# Patient Record
Sex: MALE | Race: BLACK OR AFRICAN AMERICAN | Employment: UNEMPLOYED | URBAN - METROPOLITAN AREA
[De-identification: names, ages, dates, MRNs, and addresses within clinical notes are randomized per-mention and may not be internally consistent; named-entity substitution may affect disease eponyms.]

---

## 2021-01-28 ENCOUNTER — APPOINTMENT (OUTPATIENT)
Dept: GENERAL RADIOLOGY | Age: 26
End: 2021-01-28
Payer: MEDICAID

## 2021-01-28 ENCOUNTER — HOSPITAL ENCOUNTER (EMERGENCY)
Age: 26
Discharge: HOME | End: 2021-01-28
Attending: STUDENT IN AN ORGANIZED HEALTH CARE EDUCATION/TRAINING PROGRAM
Payer: MEDICAID

## 2021-01-28 VITALS
OXYGEN SATURATION: 98 % | RESPIRATION RATE: 18 BRPM | HEART RATE: 78 BPM | TEMPERATURE: 97.2 F | DIASTOLIC BLOOD PRESSURE: 88 MMHG | BODY MASS INDEX: 24.88 KG/M2 | SYSTOLIC BLOOD PRESSURE: 136 MMHG | HEIGHT: 69 IN | WEIGHT: 168 LBS

## 2021-01-28 DIAGNOSIS — S62.339B OPEN BOXER'S FRACTURE, INITIAL ENCOUNTER: Primary | ICD-10-CM

## 2021-01-28 PROCEDURE — 99283 EMERGENCY DEPT VISIT LOW MDM: CPT

## 2021-01-28 PROCEDURE — 90715 TDAP VACCINE 7 YRS/> IM: CPT | Performed by: STUDENT IN AN ORGANIZED HEALTH CARE EDUCATION/TRAINING PROGRAM

## 2021-01-28 PROCEDURE — 73110 X-RAY EXAM OF WRIST: CPT

## 2021-01-28 PROCEDURE — 12001 RPR S/N/AX/GEN/TRNK 2.5CM/<: CPT

## 2021-01-28 PROCEDURE — 6360000002 HC RX W HCPCS: Performed by: STUDENT IN AN ORGANIZED HEALTH CARE EDUCATION/TRAINING PROGRAM

## 2021-01-28 PROCEDURE — 90471 IMMUNIZATION ADMIN: CPT | Performed by: STUDENT IN AN ORGANIZED HEALTH CARE EDUCATION/TRAINING PROGRAM

## 2021-01-28 PROCEDURE — 73130 X-RAY EXAM OF HAND: CPT

## 2021-01-28 PROCEDURE — 73630 X-RAY EXAM OF FOOT: CPT

## 2021-01-28 PROCEDURE — 73562 X-RAY EXAM OF KNEE 3: CPT

## 2021-01-28 RX ORDER — LIDOCAINE HYDROCHLORIDE 10 MG/ML
20 INJECTION, SOLUTION INFILTRATION; PERINEURAL ONCE
Status: DISCONTINUED | OUTPATIENT
Start: 2021-01-28 | End: 2021-01-28 | Stop reason: HOSPADM

## 2021-01-28 RX ORDER — ACETAMINOPHEN 325 MG/1
650 TABLET ORAL ONCE
Status: DISCONTINUED | OUTPATIENT
Start: 2021-01-28 | End: 2021-01-28 | Stop reason: HOSPADM

## 2021-01-28 RX ORDER — CEPHALEXIN 500 MG/1
500 CAPSULE ORAL 2 TIMES DAILY
Qty: 14 CAPSULE | Refills: 0 | Status: SHIPPED | OUTPATIENT
Start: 2021-01-28 | End: 2021-02-04

## 2021-01-28 RX ORDER — METHOCARBAMOL 500 MG/1
750 TABLET, FILM COATED ORAL 3 TIMES DAILY
Qty: 32 TABLET | Refills: 0 | Status: SHIPPED | OUTPATIENT
Start: 2021-01-28 | End: 2021-02-04

## 2021-01-28 RX ADMIN — TETANUS TOXOID, REDUCED DIPHTHERIA TOXOID AND ACELLULAR PERTUSSIS VACCINE, ADSORBED 0.5 ML: 5; 2.5; 8; 8; 2.5 SUSPENSION INTRAMUSCULAR at 12:17

## 2021-01-28 ASSESSMENT — ENCOUNTER SYMPTOMS
EYE PAIN: 0
SORE THROAT: 0
EYE REDNESS: 0
ABDOMINAL PAIN: 0
NAUSEA: 0
COLOR CHANGE: 0
SHORTNESS OF BREATH: 0
BACK PAIN: 0
VOMITING: 0
FACIAL SWELLING: 0
COUGH: 0
RHINORRHEA: 0
DIARRHEA: 0
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
## 2021-01-28 NOTE — ED PROVIDER NOTES
General: No focal deficit present. Mental Status: He is alert. Mental status is at baseline. Cranial Nerves: No cranial nerve deficit. MEDICAL DECISION MAKIN-year-old male presented with a injury to the right hand sustained immediately prior to arrival.  X-ray showed a boxer's fracture. This is an open injury with overlying laceration. Tetanus updated. Washed out at the bedside. Reviewed case with ortho on call Dr. Yandel Jarquin. Ok for PO antibiotics, splinting, outpatient follow up. CRITICAL CARE:       PROCEDURES:    Lac Repair    Date/Time: 2021 1:13 PM  Performed by: Idania Frey MD  Authorized by: Idania Frey MD     Consent:     Consent obtained:  Verbal    Consent given by:  Patient    Risks discussed:  Infection    Alternatives discussed:  No treatment  Anesthesia (see MAR for exact dosages): Anesthesia method:  Local infiltration    Local anesthetic:  Lidocaine 1% w/o epi  Laceration details:     Location:  Hand    Hand location:  R hand, dorsum    Length (cm):  1  Repair type:     Repair type:  Simple  Pre-procedure details:     Preparation:  Patient was prepped and draped in usual sterile fashion  Exploration:     Wound exploration: wound explored through full range of motion      Wound extent: underlying fracture      Contaminated: no    Treatment:     Area cleansed with:  Saline    Amount of cleaning:  Standard    Irrigation solution:  Sterile saline    Irrigation volume:  500    Irrigation method:  Pressure wash  Skin repair:     Repair method:  Sutures    Suture size:  5-0    Suture material:  Fast-absorbing gut    Suture technique:  Simple interrupted    Number of sutures:  1  Approximation:     Approximation:  Close  Post-procedure details:     Dressing:  Non-adherent dressing    Patient tolerance of procedure:   Tolerated well, no immediate complications    Splint Application    Date/Time: 2021 1:27 PM  Performed by: Idania Frey MD Authorized by: Iwona Dewey MD     Consent:     Consent obtained:  Verbal    Consent given by:  Patient    Risks discussed:  Discoloration and numbness  Pre-procedure details:     Sensation:  Weakness  Procedure details:     Laterality:  Right    Location:  Hand    Hand:  R hand    Splint type:  Ulnar gutter    Supplies:  Ortho-Glass  Post-procedure details:     Pain:  Unchanged    Sensation:  Unchanged    Skin color:  Normal     Patient tolerance of procedure: Tolerated well, no immediate complications        DIAGNOSTIC RESULTS   EKG:All EKG's are interpreted by the Emergency Department Physician who either signs or Co-signs this chart in the absence of a cardiologist.        RADIOLOGY:All plain film, CT, MRI, and formal ultrasound images (except ED bedside ultrasound) are read by the radiologist, see reports below, unless otherwisenoted in MDM or here. XR FOOT RIGHT (MIN 3 VIEWS)   Preliminary Result   5th metacarpal fracture. No acute osseous abnormality of the right knee or foot. XR KNEE RIGHT (3 VIEWS)   Preliminary Result   5th metacarpal fracture. No acute osseous abnormality of the right knee or foot. XR WRIST RIGHT (MIN 3 VIEWS)   Preliminary Result   5th metacarpal fracture. No acute osseous abnormality of the right knee or foot. XR HAND RIGHT (MIN 3 VIEWS)   Preliminary Result   5th metacarpal fracture. No acute osseous abnormality of the right knee or foot. LABS: All lab results were reviewed by myself, and all abnormals are listed below.   Labs Reviewed - No data to display    EMERGENCY DEPARTMENTCOURSE:         Vitals:    Vitals:    01/28/21 1017   BP: 136/88   Pulse: 78   Resp: 18   Temp: 97.2 °F (36.2 °C)   TempSrc: Oral   SpO2: 98%   Weight: 168 lb (76.2 kg)   Height: 5' 9\" (1.753 m)       The patient was given the following medications while in the emergency department:  Orders Placed This Encounter   Medications  Tetanus-Diphth-Acell Pertussis (BOOSTRIX) injection 0.5 mL    lidocaine 1 % injection 20 mL    acetaminophen (TYLENOL) tablet 650 mg    cephALEXin (KEFLEX) 500 MG capsule     Sig: Take 1 capsule by mouth 2 times daily for 7 days     Dispense:  14 capsule     Refill:  0     CONSULTS:  IP CONSULT TO ORTHOPEDIC SURGERY    FINAL IMPRESSION      1.  Open boxer's fracture, initial encounter          DISPOSITION/PLAN   DISPOSITION Discharge - Pending Orders Complete 01/28/2021 12:56:56 PM      PATIENT REFERRED TO:  Tera Lozano MD  [address] [place] [id number]  [phone]    Schedule an appointment as soon as possible for a visit in 1 week      DISCHARGE MEDICATIONS:  New Prescriptions    CEPHALEXIN (KEFLEX) 500 MG CAPSULE    Take 1 capsule by mouth 2 times daily for 7 days     Michelle Contreras MD  Attending Emergency Physician                    Michelle Contreras MD  01/28/21 3784

## 2021-02-03 ENCOUNTER — OFFICE VISIT (OUTPATIENT)
Dept: ORTHOPEDIC SURGERY | Age: 26
End: 2021-02-03

## 2021-02-03 DIAGNOSIS — S62.91XB OPEN FRACTURE OF RIGHT HAND, INITIAL ENCOUNTER: Primary | ICD-10-CM

## 2021-02-03 PROCEDURE — 99024 POSTOP FOLLOW-UP VISIT: CPT | Performed by: ORTHOPAEDIC SURGERY
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
## 2021-02-25 ENCOUNTER — OFFICE VISIT (OUTPATIENT)
Dept: ORTHOPEDIC SURGERY | Age: 26
End: 2021-02-25

## 2021-02-25 VITALS — TEMPERATURE: 97.8 F

## 2021-02-25 DIAGNOSIS — S62.326A DISPLACED FRACTURE OF SHAFT OF FIFTH METACARPAL BONE, RIGHT HAND, INITIAL ENCOUNTER FOR CLOSED FRACTURE: Primary | ICD-10-CM

## 2021-02-25 PROCEDURE — 99024 POSTOP FOLLOW-UP VISIT: CPT | Performed by: ORTHOPAEDIC SURGERY

## 2021-02-25 NOTE — PROGRESS NOTES
Status:   Future     Number of Occurrences:   1     Standing Expiration Date:   2/25/2022        Milagros Mckee MD    Please note that this chart was generated using voicerecognition Dragon dictation software. Although every effort was made to ensurethe accuracy of this automated transcription, some errors in transcription may haveoccurred.

## 2021-03-09 ENCOUNTER — OFFICE VISIT (OUTPATIENT)
Dept: ORTHOPEDIC SURGERY | Age: 26
End: 2021-03-09

## 2021-03-09 DIAGNOSIS — S62.326A DISPLACED FRACTURE OF SHAFT OF FIFTH METACARPAL BONE, RIGHT HAND, INITIAL ENCOUNTER FOR CLOSED FRACTURE: Primary | ICD-10-CM

## 2021-03-09 PROCEDURE — 99024 POSTOP FOLLOW-UP VISIT: CPT | Performed by: ORTHOPAEDIC SURGERY

## 2021-03-09 NOTE — PROGRESS NOTES
Patient ID: Kita Severe is a 31 y.o. male    Chief Compliant:  No chief complaint on file. HPI:  This is a 31 y.o. male who presents to the clinic today for right hand fracture follow up. Patient comes in with splint. Date of injury 1/28/21. He states soreness is improving. Difficulty moving 5th digit. Patient initially seen 4 weeks post injury. We therefore elected to treat this nonoperatively    Review of Systems   All other systems reviewed and are negative. Past History:    Current Outpatient Medications:     polyethylene glycol (MIRALAX) PACK packet, Take 17 g by mouth daily. , Disp: , Rfl:     fluticasone (FLOVENT HFA) 110 MCG/ACT inhaler, Inhale 1 puff into the lungs 2 times daily. , Disp: , Rfl:   Allergies   Allergen Reactions    Shellfish-Derived Products Shortness Of Breath    Aspirin     Ibuprofen     Iodinated Diagnostic Agents [Iodinated Diagnostic Agents]      Social History     Socioeconomic History    Marital status: Single     Spouse name: Not on file    Number of children: Not on file    Years of education: Not on file    Highest education level: Not on file   Occupational History    Not on file   Social Needs    Financial resource strain: Not on file    Food insecurity     Worry: Not on file     Inability: Not on file    Transportation needs     Medical: Not on file     Non-medical: Not on file   Tobacco Use    Smoking status: Never Smoker    Smokeless tobacco: Never Used   Substance and Sexual Activity    Alcohol use: Yes     Comment: daily, consumes 1 liters of liquor per day    Drug use: Yes     Types: Marijuana    Sexual activity: Not on file   Lifestyle    Physical activity     Days per week: Not on file     Minutes per session: Not on file    Stress: Not on file   Relationships    Social connections     Talks on phone: Not on file     Gets together: Not on file     Attends Buddhism service: Not on file     Active member of club or organization: Not on file     Attends meetings of clubs or organizations: Not on file     Relationship status: Not on file    Intimate partner violence     Fear of current or ex partner: Not on file     Emotionally abused: Not on file     Physically abused: Not on file     Forced sexual activity: Not on file   Other Topics Concern    Not on file   Social History Narrative    Not on file     Past Medical History:   Diagnosis Date    Asthma     Hypertension      Past Surgical History:   Procedure Laterality Date    FINGER SURGERY       No family history on file. Physical Exam:  Vitals signs and nursing note reviewed. Constitutional:       Appearance: She is well-developed. HENT:      Head: Normocephalic and atraumatic. Nose: Nose normal.   Eyes:      Conjunctiva/sclera: Conjunctivae normal.   Neck:      Musculoskeletal: Normal range of motion and neck supple. Pulmonary:      Effort: Pulmonary effort is normal. No respiratory distress. Musculoskeletal:      Comments: Normal gait     Skin:     General: Skin is warm and dry. Neurological:      Mental Status: She is alert and oriented to person, place, and time. Sensory: No sensory deficit. Psychiatric:         Behavior: Behavior normal.         Thought Content: Thought content normal.    Diagnostic xrays:     AP lateral and oblique right hand status post small finger metacarpal shaft fracture abundant callus formation    Assessment and Plan:  1. Displaced fracture of shaft of fifth metacarpal bone, right hand, initial encounter for closed fracture      Work on ROM of the fingers. Follow up in 1 week. Provider Attestation:  Neal Leiva, personally performed the services described in this documentation. All medical record entries made by the scribe were at my direction and in my presence. I have reviewed the chart and discharge instructions and agree that the records reflect my personal performance and is accurate and complete. Laura Leiva, MD 3/9/21     Scribe Attestation:  By signing my name below, Carmelo Dione, attest that this documentation has been prepared under the direction and in the presence of Dr. Cash De Leon. Electronically signed: Lacy Kilgore, 3/9/21     Please note that this chart was generated using voice recognition Dragon dictation software. Although every effort was made to ensure the accuracy of this automated transcription, some errors in transcription may have occurred.

## 2021-03-16 ENCOUNTER — OFFICE VISIT (OUTPATIENT)
Dept: ORTHOPEDIC SURGERY | Age: 26
End: 2021-03-16
Payer: MEDICAID

## 2021-03-16 VITALS — HEIGHT: 69 IN | BODY MASS INDEX: 25.03 KG/M2 | WEIGHT: 169 LBS

## 2021-03-16 DIAGNOSIS — S62.336D CLOSED DISPLACED FRACTURE OF NECK OF FIFTH METACARPAL BONE OF RIGHT HAND WITH ROUTINE HEALING, SUBSEQUENT ENCOUNTER: Primary | ICD-10-CM

## 2021-03-16 PROCEDURE — 1036F TOBACCO NON-USER: CPT | Performed by: ORTHOPAEDIC SURGERY

## 2021-03-16 PROCEDURE — G8427 DOCREV CUR MEDS BY ELIG CLIN: HCPCS | Performed by: ORTHOPAEDIC SURGERY

## 2021-03-16 PROCEDURE — 99213 OFFICE O/P EST LOW 20 MIN: CPT | Performed by: ORTHOPAEDIC SURGERY

## 2021-03-16 PROCEDURE — G8484 FLU IMMUNIZE NO ADMIN: HCPCS | Performed by: ORTHOPAEDIC SURGERY

## 2021-03-16 PROCEDURE — G8420 CALC BMI NORM PARAMETERS: HCPCS | Performed by: ORTHOPAEDIC SURGERY

## 2021-03-16 NOTE — PROGRESS NOTES
Patient ID: Michael Prater is a 31 y.o. male    Chief Compliant:  No chief complaint on file. HPI:  This is a 31 y.o. male who presents to the clinic today for 1 week follow up of right hand fracture. Date of injury 1/28/21. Patient initially seen 4 weeks post injury. We therefore elected to treat this nonoperatively    Patient notes continued stiffness and soreness of small finger. Review of Systems   All other systems reviewed and are negative. Past History:    Current Outpatient Medications:     polyethylene glycol (MIRALAX) PACK packet, Take 17 g by mouth daily. , Disp: , Rfl:     fluticasone (FLOVENT HFA) 110 MCG/ACT inhaler, Inhale 1 puff into the lungs 2 times daily. , Disp: , Rfl:   Allergies   Allergen Reactions    Shellfish-Derived Products Shortness Of Breath    Aspirin     Ibuprofen     Iodinated Diagnostic Agents [Iodinated Diagnostic Agents]      Social History     Socioeconomic History    Marital status: Single     Spouse name: Not on file    Number of children: Not on file    Years of education: Not on file    Highest education level: Not on file   Occupational History    Not on file   Social Needs    Financial resource strain: Not on file    Food insecurity     Worry: Not on file     Inability: Not on file    Transportation needs     Medical: Not on file     Non-medical: Not on file   Tobacco Use    Smoking status: Never Smoker    Smokeless tobacco: Never Used   Substance and Sexual Activity    Alcohol use: Yes     Comment: daily, consumes 1 liters of liquor per day    Drug use: Yes     Types: Marijuana    Sexual activity: Not on file   Lifestyle    Physical activity     Days per week: Not on file     Minutes per session: Not on file    Stress: Not on file   Relationships    Social connections     Talks on phone: Not on file     Gets together: Not on file     Attends Confucianism service: Not on file     Active member of club or organization: Not on file Attends meetings of clubs or organizations: Not on file     Relationship status: Not on file    Intimate partner violence     Fear of current or ex partner: Not on file     Emotionally abused: Not on file     Physically abused: Not on file     Forced sexual activity: Not on file   Other Topics Concern    Not on file   Social History Narrative    Not on file     Past Medical History:   Diagnosis Date    Asthma     Hypertension      Past Surgical History:   Procedure Laterality Date    FINGER SURGERY       No family history on file. Physical Exam:  Vitals signs and nursing note reviewed. Constitutional:       Appearance: She is well-developed. HENT:      Head: Normocephalic and atraumatic. Nose: Nose normal.   Eyes:      Conjunctiva/sclera: Conjunctivae normal.   Neck:      Musculoskeletal: Normal range of motion and neck supple. Pulmonary:      Effort: Pulmonary effort is normal. No respiratory distress. Musculoskeletal:      Comments: Normal gait     Skin:     General: Skin is warm and dry. Neurological:      Mental Status: She is alert and oriented to person, place, and time. Sensory: No sensory deficit. Psychiatric:         Behavior: Behavior normal.         Thought Content: Thought content normal.    Diagnostic xrays:     AP and lateral right hand small finger metacarpal shaft fracture slightly apex dorsally angulated slightly shortened significant callus formation but does not appear to be healed    Assessment and Plan:  1. Closed displaced fracture of neck of fifth metacarpal bone of right hand with routine healing, subsequent encounter          Follow up in 3 weeks. If patient is still stiff and painful, we will consider surgery. Provider Attestation:  Tamera Bumpers Beeks, personally performed the services described in this documentation. All medical record entries made by the scribe were at my direction and in my presence.  I have reviewed the chart and discharge instructions and agree that the records reflect my personal performance and is accurate and complete. Ryan Delgado MD 3/16/21     Scribe Attestation:  By signing my name below, Belem Hutton, attest that this documentation has been prepared under the direction and in the presence of Dr. Sonido Villar. Electronically signed: Lacy Iraheta, 3/16/21     Please note that this chart was generated using voice recognition Dragon dictation software. Although every effort was made to ensure the accuracy of this automated transcription, some errors in transcription may have occurred.

## 2021-03-25 ENCOUNTER — APPOINTMENT (OUTPATIENT)
Dept: GENERAL RADIOLOGY | Age: 26
End: 2021-03-25
Payer: MEDICAID

## 2021-03-25 ENCOUNTER — HOSPITAL ENCOUNTER (EMERGENCY)
Age: 26
Discharge: HOME | End: 2021-03-25
Attending: EMERGENCY MEDICINE
Payer: MEDICAID

## 2021-03-25 VITALS
WEIGHT: 169 LBS | DIASTOLIC BLOOD PRESSURE: 87 MMHG | RESPIRATION RATE: 18 BRPM | TEMPERATURE: 97.7 F | OXYGEN SATURATION: 98 % | HEART RATE: 78 BPM | HEIGHT: 69 IN | SYSTOLIC BLOOD PRESSURE: 137 MMHG | BODY MASS INDEX: 25.03 KG/M2

## 2021-03-25 DIAGNOSIS — M79.645 PAIN OF LEFT THUMB: Primary | ICD-10-CM

## 2021-03-25 DIAGNOSIS — S62.356D CLOSED NONDISPLACED FRACTURE OF SHAFT OF FIFTH METACARPAL BONE OF RIGHT HAND WITH ROUTINE HEALING, SUBSEQUENT ENCOUNTER: ICD-10-CM

## 2021-03-25 PROCEDURE — 99283 EMERGENCY DEPT VISIT LOW MDM: CPT

## 2021-03-25 PROCEDURE — 73130 X-RAY EXAM OF HAND: CPT

## 2021-03-25 PROCEDURE — 73110 X-RAY EXAM OF WRIST: CPT

## 2021-03-25 ASSESSMENT — PAIN DESCRIPTION - FREQUENCY: FREQUENCY: CONTINUOUS

## 2021-03-25 NOTE — ED NOTES
The patient is a 31year old male that came to the ER today due to his thumb hurting. SW met with the patient due to the patient telling the ER Resident that he is homicidal. SW met with the patient and asked about his  Homicidal thoughts. When SW asked patient about homicidal thoughts, patient states, \"Get out of my face. \" SW discussed with ER Resident.

## 2021-03-25 NOTE — ED PROVIDER NOTES
[facility]  Emergency Department  Faculty Attestation     I performed a history and physical examination of the patient and discussed management with the resident. I reviewed the residents note and agree with the documented findings and plan of care. Any areas of disagreement are noted on the chart. I was personally present for the key portions of any procedures. I have documented in the chart those procedures where I was not present during the key portions. I have reviewed the emergency nurses triage note. I agree with the chief complaint, past medical history, past surgical history, allergies, medications, social and family history as documented unless otherwise noted below. For Physician Assistant/ Nurse Practitioner cases/documentation I have personally evaluated this patient and have completed at least one if not all key elements of the E/M (history, physical exam, and MDM). Additional findings are as noted. Primary Care Physician:  No primary care provider on file. Screenings:  [unfilled]    CHIEF COMPLAINT       Chief Complaint   Patient presents with    Finger Pain     left thumb, right pinky pain       RECENT VITALS:   Temp: 97.7 °F (36.5 °C),  Pulse: 78, Resp: 18, BP: 137/87    LABS:  Labs Reviewed - No data to display    Radiology  XR HAND LEFT (MIN 3 VIEWS)   Final Result   Mild nonspecific left thumb soft tissue swelling. Otherwise unremarkable left hand. XR HAND RIGHT (MIN 3 VIEWS)   Final Result   Normal wrist radiographs      RIGHT HAND FINDINGS:   Incomplete union involving the mid 5th metacarpal fracture. Mild callus   formation. There is no evidence of  dislocation. The  joint spaces appear   well maintained. The soft tissues are unremarkable. IMPRESSION:   Incomplete union involving the mid 5th metacarpal fracture. Mild callus   formation.          XR WRIST LEFT (MIN 3 VIEWS)   Final Result   Normal wrist radiographs RIGHT HAND FINDINGS:   Incomplete union involving the mid 5th metacarpal fracture. Mild callus   formation. There is no evidence of  dislocation. The  joint spaces appear   well maintained. The soft tissues are unremarkable. IMPRESSION:   Incomplete union involving the mid 5th metacarpal fracture. Mild callus   formation. Attending Physician Additional  Notes    Patient fractured his right hand in late January. He was then ulnar gutter splint for 9 weeks. It is now removed. He has difficulty extending and even flexing his small finger. There is no repeat injury swelling redness fevers chills. He did have a minor trauma to his left wrist and thumb 4 days ago. There is dramatic swelling at the base of his thumb and painful movement of the wrist and thumb then which is partially improved. The swelling is dramatically improved. On exam he is nontoxic afebrile vital signs are normal.  He has visible bony deformity the midshaft of the right metacarpal.  No erythema warmth induration or fluctuance. He has limited strength in the small finger but can passively move his finger. He is able to initiate extension of the small finger at the MCP PCP and DIP, his flexion strength is stronger. No true contracture. The left wrist has tenderness in the snuffbox region. There is also pain with axial loading of the thumb. No other deformity. Impression is healing left small finger metacarpal fracture with weakness of the small finger, left wrist injury with concern for scaphoid fracture. Plan is imaging of both sides, Ace wrap the right hand, simple analgesics, thumb spica splint the left wrist/thumb, elevation of the left, continue passive range of movement of the small finger, follow-up to plastic surgery. Carleen Reagan.  Christiane Nunez MD, [facility]  Attending Emergency  Physician               Patricio Carolina MD  03/25/21 1944

## 2021-03-26 NOTE — ED PROVIDER NOTES
[facility] ED  Emergency Department Encounter  Emergency Medicine Resident     Pt Name: Harman Cooks  MRN: [id number]  [place] 1995  Date ofevaluation: 3/25/21  PCP:  No primary care provider on file. CHIEF COMPLAINT       Chief Complaint   Patient presents with    Finger Pain     left thumb, right pinky pain     HISTORY OF PRESENT ILLNESS  (Location/Symptom, Timing/Onset, Context/Setting, Quality, Duration, Modifying Factors, Severity, Associated signs/symptoms)     Harman Cooks is a 31 y.o. male who presents with bilateral hand pain. Patient reports that he punched a wall with his right hand several months ago and has a boxer's fracture. He was evaluated in outside emergency department for this, but states that he cannot move his pinky appropriately since the injury. He is not followed up since then. Additionally reports he is having atraumatic pain to the left thumb.  he states that it began several days ago and has been constant since onset. No obvious trauma or inciting factor. Also reports difficulty with moving his left thumb. No loss of sensation. PAST MEDICAL / SURGICAL / SOCIAL / FAMILY HISTORY      has a past medical history of Asthma and Hypertension. has a past surgical history that includes Finger surgery.     Social History     Socioeconomic History    Marital status: Single     Spouse name: Not on file    Number of children: Not on file    Years of education: Not on file    Highest education level: Not on file   Occupational History    Not on file   Social Needs    Financial resource strain: Not on file    Food insecurity     Worry: Not on file     Inability: Not on file    Transportation needs     Medical: Not on file     Non-medical: Not on file   Tobacco Use    Smoking status: Never Smoker    Smokeless tobacco: Never Used   Substance and Sexual Activity    Alcohol use: Yes     Comment: daily, consumes 1 liters of liquor per day    Drug use: toxic-appearing or diaphoretic. HENT:      Head: Normocephalic and atraumatic. Eyes:      General: No scleral icterus. Neck:      Musculoskeletal: Neck supple. Cardiovascular:      Rate and Rhythm: Normal rate and regular rhythm. Pulmonary:      Effort: Pulmonary effort is normal. No respiratory distress. Breath sounds: Normal breath sounds. No stridor. No wheezing, rhonchi or rales. Musculoskeletal:      Comments: Pain to palpation over the right distal metatarsal of the right hand. Patient states is unable to flex or extend his pinky however there is no ligamentous laxity noted in this area. No significant pain with passive range of motion. There is no dactylitis or tenderness palpation over the flexor tendon sheath. Patient also reports tenderness palpation over the base of the right thumb. No snuffbox tenderness. No significant pain with passive range of motion of the thumb. Skin:     General: Skin is warm and dry. Comments: No skin changes about the bilateral hands. Neurological:      General: No focal deficit present. Mental Status: He is alert and oriented to person, place, and time. Psychiatric:         Mood and Affect: Mood normal.         Behavior: Behavior normal.         DIAGNOSTICS     PLAN (LABS / IMAGING / EKG):  Orders Placed This Encounter   Procedures    XR HAND LEFT (MIN 3 VIEWS)    XR HAND RIGHT (MIN 3 VIEWS)    XR WRIST LEFT (MIN 3 VIEWS)    [employer] Thumb Spica, Left       MEDICATIONS ORDERED:  No orders of the defined types were placed in this encounter. DIAGNOSTIC RESULTS / EMERGENCYDEPARTMENT COURSE / MDM     LABS:  No results found for this visit on 03/25/21. RADIOLOGY:  XR HAND LEFT (MIN 3 VIEWS)   Final Result   Mild nonspecific left thumb soft tissue swelling. Otherwise unremarkable left hand.          XR HAND RIGHT (MIN 3 VIEWS)   Final Result   Normal wrist radiographs      RIGHT HAND FINDINGS:   Incomplete union involving the mid 5th metacarpal fracture. Mild callus   formation. There is no evidence of  dislocation. The  joint spaces appear   well maintained. The soft tissues are unremarkable. IMPRESSION:   Incomplete union involving the mid 5th metacarpal fracture. Mild callus   formation. XR WRIST LEFT (MIN 3 VIEWS)   Final Result   Normal wrist radiographs      RIGHT HAND FINDINGS:   Incomplete union involving the mid 5th metacarpal fracture. Mild callus   formation. There is no evidence of  dislocation. The  joint spaces appear   well maintained. The soft tissues are unremarkable. IMPRESSION:   Incomplete union involving the mid 5th metacarpal fracture. Mild callus   formation. EMERGENCY DEPARTMENT COURSE:         MDM: Patient presenting with bilateral hand pain. On exam is nontoxic in no acute distress. Unremarkable. He has pain to palpation over the left thumb with axial loading of the thumb. There is no snuffbox tenderness to suggest a scaphoid fracture. No significant pain with passive range of motion. There is no signs of flexor tenosynovitis on either hand. Does also have some pain and limited range of motion of his right pinky. Both fingers are neurovascularly intact otherwise. Plan is for x-ray of bilateral hands, reassess. X-rays show known distal metacarpal fracture of the right hand. There is no obvious osseous abnormalities of the left thumb. Provided a left-sided thumb spica splint. Will discharge with follow-up with hand surgery. Discussed supportive care measures. Strict return precautions given. Patient instructed to follow with his primary care provider as soon as possible for follow up. Patient and/or family expressed understanding and agreement with this plan. PROCEDURES:  none    CONSULTS:  None    FINAL IMPRESSION      1. Pain of left thumb    2.  Closed nondisplaced fracture of shaft of fifth metacarpal bone of right hand with routine healing, subsequent encounter          DISPOSITION / PLAN     DISPOSITION Decision To Discharge 03/25/2021 07:23:18 PM      PATIENT REFERRED TO:  Lanie Hartman MD  [address] [address] [phone]    Schedule an appointment as soon as possible for a visit   Follow up of this visit      DISCHARGE MEDICATIONS:  Discharge Medication List as of 3/25/2021  7:25 PM          Khurram Cheek DO  Emergency Medicine Resident  [address] [facility]    (Please note that portions of this note were completed with a voice recognition program.  Efforts were made to edit thedictations but occasionally words are mis-transcribed.)       Khurram Cheke DO  Resident  03/25/21 1864

## 2021-04-06 ENCOUNTER — OFFICE VISIT (OUTPATIENT)
Dept: ORTHOPEDIC SURGERY | Age: 26
End: 2021-04-06

## 2021-04-06 VITALS — BODY MASS INDEX: 25.03 KG/M2 | WEIGHT: 169 LBS | HEIGHT: 69 IN

## 2021-04-06 DIAGNOSIS — M65.4 DE QUERVAIN'S DISEASE (TENOSYNOVITIS): ICD-10-CM

## 2021-04-06 DIAGNOSIS — M79.641 RIGHT HAND PAIN: Primary | ICD-10-CM

## 2021-04-06 DIAGNOSIS — S62.326A DISPLACED FRACTURE OF SHAFT OF FIFTH METACARPAL BONE, RIGHT HAND, INITIAL ENCOUNTER FOR CLOSED FRACTURE: ICD-10-CM

## 2021-04-06 PROCEDURE — 99024 POSTOP FOLLOW-UP VISIT: CPT | Performed by: ORTHOPAEDIC SURGERY

## 2021-04-06 NOTE — PROGRESS NOTES
Patient ID: Justyna Avendaño is a 31 y.o. male    Chief Compliant:  No chief complaint on file. HPI:  This is a 31 y.o. male who presents to the clinic today for 3 week follow up of open boxer's fracture. Patient notes continued dull, grinding pain of the right small finger. Injury occurred about 2.5 months ago. Patient wore splint for about 9 weeks, no longer wearing splint. Additionally, patient notes stiffness of the left thumb. He denies triggering. Review of Systems   All other systems reviewed and are negative.       Past History:    Current Outpatient Medications:     Multiple Vitamins-Minerals (HAIR SKIN & NAILS ADVANCED PO), Take 1 capsule by mouth daily, Disp: , Rfl:   Allergies   Allergen Reactions    Aspirin Shortness Of Breath    Ibuprofen Shortness Of Breath    Iodinated Diagnostic Agents Shortness Of Breath    Shellfish-Derived Products Shortness Of Breath     Blurred vision, \"Seize up\"     Social History     Socioeconomic History    Marital status: Single     Spouse name: Not on file    Number of children: Not on file    Years of education: Not on file    Highest education level: Not on file   Occupational History    Not on file   Social Needs    Financial resource strain: Not on file    Food insecurity     Worry: Not on file     Inability: Not on file    Transportation needs     Medical: Not on file     Non-medical: Not on file   Tobacco Use    Smoking status: Never Smoker    Smokeless tobacco: Never Used   Substance and Sexual Activity    Alcohol use: Yes     Comment: daily, consumes 1 liters of liquor per day    Drug use: Yes     Types: Marijuana     Comment: occasionally uses cigar wrappers to roll joint    Sexual activity: Not on file   Lifestyle    Physical activity     Days per week: Not on file     Minutes per session: Not on file    Stress: Not on file   Relationships    Social connections     Talks on phone: Not on file     Gets together: Not on file Attends Moravian service: Not on file     Active member of club or organization: Not on file     Attends meetings of clubs or organizations: Not on file     Relationship status: Not on file    Intimate partner violence     Fear of current or ex partner: Not on file     Emotionally abused: Not on file     Physically abused: Not on file     Forced sexual activity: Not on file   Other Topics Concern    Not on file   Social History Narrative    Not on file     Past Medical History:   Diagnosis Date    Asthma     Hypertension      Past Surgical History:   Procedure Laterality Date    FINGER SURGERY       No family history on file. Physical Exam:  Vitals signs and nursing note reviewed. Constitutional:       Appearance: She is well-developed. HENT:      Head: Normocephalic and atraumatic. Nose: Nose normal.   Eyes:      Conjunctiva/sclera: Conjunctivae normal.   Neck:      Musculoskeletal: Normal range of motion and neck supple. Pulmonary:      Effort: Pulmonary effort is normal. No respiratory distress. Musculoskeletal:      Comments: Normal gait     Skin:     General: Skin is warm and dry. Neurological:      Mental Status: She is alert and oriented to person, place, and time. Sensory: No sensory deficit. Psychiatric:         Behavior: Behavior normal.         Thought Content: Thought content normal.  Right small finger still demonstrates a bit of loss of active flexion passive flexion is easily obtained a full fist.    Left hand patient is complaining of pain over the first dorsal compartment positive Finkelstein    Diagnostic xrays:     AP and lateral right hand abundant callus formation small finger metacarpal shaft fracture appears to be healed radially    Assessment and Plan:  1. Right hand pain    2. De Quervain's disease (tenosynovitis)    3.  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 All medical record entries made by the scribe were at my direction and in my presence. I have reviewed the chart and discharge instructions and agree that the records reflect my personal performance and is accurate and complete. Lebron Josue MD 4/6/21     Scribe Attestation:  By signing my name below, Josette Lowery, attest that this documentation has been prepared under the direction and in the presence of Dr. Annamaria Christianson. Electronically signed: Lacy Shabazz, 4/6/21     Please note that this chart was generated using voice recognition Dragon dictation software. Although every effort was made to ensure the accuracy of this automated transcription, some errors in transcription may have occurred.

## 2021-04-06 NOTE — LETTER
[address]  [address]  [address] [facility] [address]  Phone: [phone]  Fax: [phone]    Alka Estrada MD        April 6, 2021     Patient: Zenobia Null   YOB: 1995   Date of Visit: 4/6/2021       To Whom It May Concern: It is my medical opinion that Hellen Wheeler should remain out of work until Monday, 6/7/2021. If you have any questions or concerns, please don't hesitate to call.     Sincerely,        Alka Estrada MD

## 2021-05-11 ENCOUNTER — OFFICE VISIT (OUTPATIENT)
Dept: ORTHOPEDIC SURGERY | Age: 26
End: 2021-05-11
Payer: MEDICAID

## 2021-05-11 DIAGNOSIS — S62.336D CLOSED DISPLACED FRACTURE OF NECK OF FIFTH METACARPAL BONE OF RIGHT HAND WITH ROUTINE HEALING, SUBSEQUENT ENCOUNTER: Primary | ICD-10-CM

## 2021-05-11 PROCEDURE — G8428 CUR MEDS NOT DOCUMENT: HCPCS | Performed by: ORTHOPAEDIC SURGERY

## 2021-05-11 PROCEDURE — 99213 OFFICE O/P EST LOW 20 MIN: CPT | Performed by: ORTHOPAEDIC SURGERY

## 2021-05-11 PROCEDURE — 1036F TOBACCO NON-USER: CPT | Performed by: ORTHOPAEDIC SURGERY

## 2021-05-11 PROCEDURE — G8420 CALC BMI NORM PARAMETERS: HCPCS | Performed by: ORTHOPAEDIC SURGERY

## 2021-05-11 NOTE — PROGRESS NOTES
Patient ID: Camille Alejo is a 31 y.o. male    Chief Compliant:  No chief complaint on file. HPI:  This is a 31 y.o. male who presents to the clinic today for 1 month follow up for open boxer's fracture     Patient notes continued dull, grinding pain of the right small finger    It is severly interfering with his ADLs      Patient is cussing and disruptive in the waiting room he was brought into the clinic early despite the fact that he showed up for his clinic appointment late    Patient was on his phone entire visit    Patient was cussing to people on his phone    Trying to see the patient he did not get off his phone    Patient continued to cuss with pressured speech during visit    Patient seen after this patient reports that in the waiting room he was calling the mother Fu. .ers and threatening to shoot up the waiting room        Review of Systems   All other systems reviewed and are negative.       Past History:    Current Outpatient Medications:     Multiple Vitamins-Minerals (HAIR SKIN & NAILS ADVANCED PO), Take 1 capsule by mouth daily, Disp: , Rfl:   Allergies   Allergen Reactions    Aspirin Shortness Of Breath    Ibuprofen Shortness Of Breath    Iodinated Diagnostic Agents Shortness Of Breath    Shellfish-Derived Products Shortness Of Breath     Blurred vision, \"Seize up\"     Social History     Socioeconomic History    Marital status: Single     Spouse name: Not on file    Number of children: Not on file    Years of education: Not on file    Highest education level: Not on file   Occupational History    Not on file   Social Needs    Financial resource strain: Not on file    Food insecurity     Worry: Not on file     Inability: Not on file    Transportation needs     Medical: Not on file     Non-medical: Not on file   Tobacco Use    Smoking status: Never Smoker    Smokeless tobacco: Never Used   Substance and Sexual Activity    Alcohol use: Yes     Comment: daily, consumes 1 liters of liquor per day    Drug use: Yes     Types: Marijuana     Comment: occasionally uses cigar wrappers to roll joint    Sexual activity: Not on file   Lifestyle    Physical activity     Days per week: Not on file     Minutes per session: Not on file    Stress: Not on file   Relationships    Social connections     Talks on phone: Not on file     Gets together: Not on file     Attends Sikh service: Not on file     Active member of club or organization: Not on file     Attends meetings of clubs or organizations: Not on file     Relationship status: Not on file    Intimate partner violence     Fear of current or ex partner: Not on file     Emotionally abused: Not on file     Physically abused: Not on file     Forced sexual activity: Not on file   Other Topics Concern    Not on file   Social History Narrative    Not on file     Past Medical History:   Diagnosis Date    Asthma     Hypertension      Past Surgical History:   Procedure Laterality Date    FINGER SURGERY       No family history on file. Physical Exam:  Vitals signs and nursing note reviewed. Constitutional:       Appearance: She is well-developed. HENT:      Head: Normocephalic and atraumatic. Nose: Nose normal.   Eyes:      Conjunctiva/sclera: Conjunctivae normal.   Neck:      Musculoskeletal: Normal range of motion and neck supple. Pulmonary:      Effort: Pulmonary effort is normal. No respiratory distress. Musculoskeletal:      Comments: Normal gait     Skin:     General: Skin is warm and dry. Neurological:      Mental Status: She is alert and oriented to person, place, and time. Sensory: No sensory deficit. Psychiatric:         Behavior: Behavior normal.         Thought Content:  Thought content normal.    Physical exam patient with prominence fifth metacarpal shaft    Diagnostic imaging:    AP lateral right hand reveals significant callus formation about a small finger metacarpal shaft fracture    Assessment and

## 2022-06-15 ENCOUNTER — APPOINTMENT (OUTPATIENT)
Dept: GENERAL RADIOLOGY | Age: 27
End: 2022-06-15
Payer: MEDICAID

## 2022-06-15 ENCOUNTER — HOSPITAL ENCOUNTER (EMERGENCY)
Age: 27
Discharge: HOME | End: 2022-06-15
Attending: STUDENT IN AN ORGANIZED HEALTH CARE EDUCATION/TRAINING PROGRAM
Payer: MEDICAID

## 2022-06-15 VITALS
WEIGHT: 178 LBS | OXYGEN SATURATION: 100 % | HEART RATE: 86 BPM | SYSTOLIC BLOOD PRESSURE: 134 MMHG | DIASTOLIC BLOOD PRESSURE: 82 MMHG | BODY MASS INDEX: 26.36 KG/M2 | TEMPERATURE: 98.4 F | RESPIRATION RATE: 18 BRPM | HEIGHT: 69 IN

## 2022-06-15 DIAGNOSIS — L03.221 CELLULITIS OF NECK: ICD-10-CM

## 2022-06-15 DIAGNOSIS — R21 RASH AND OTHER NONSPECIFIC SKIN ERUPTION: Primary | ICD-10-CM

## 2022-06-15 DIAGNOSIS — M25.511 ACUTE PAIN OF RIGHT SHOULDER: ICD-10-CM

## 2022-06-15 PROCEDURE — 96372 THER/PROPH/DIAG INJ SC/IM: CPT

## 2022-06-15 PROCEDURE — 99284 EMERGENCY DEPT VISIT MOD MDM: CPT

## 2022-06-15 PROCEDURE — 6360000002 HC RX W HCPCS: Performed by: NURSE PRACTITIONER

## 2022-06-15 PROCEDURE — 73030 X-RAY EXAM OF SHOULDER: CPT

## 2022-06-15 RX ORDER — DEXAMETHASONE SODIUM PHOSPHATE 10 MG/ML
10 INJECTION, SOLUTION INTRAMUSCULAR; INTRAVENOUS ONCE
Status: COMPLETED | OUTPATIENT
Start: 2022-06-15 | End: 2022-06-15

## 2022-06-15 RX ORDER — CYCLOBENZAPRINE HCL 10 MG
10 TABLET ORAL 3 TIMES DAILY PRN
Qty: 15 TABLET | Refills: 0 | Status: SHIPPED | OUTPATIENT
Start: 2022-06-15 | End: 2022-06-20

## 2022-06-15 RX ORDER — CEPHALEXIN 500 MG/1
500 CAPSULE ORAL 3 TIMES DAILY
Qty: 21 CAPSULE | Refills: 0 | Status: SHIPPED | OUTPATIENT
Start: 2022-06-15 | End: 2022-06-22

## 2022-06-15 RX ORDER — HYDROXYZINE HYDROCHLORIDE 25 MG/1
25 TABLET, FILM COATED ORAL EVERY 6 HOURS PRN
Qty: 15 TABLET | Refills: 0 | Status: SHIPPED | OUTPATIENT
Start: 2022-06-15

## 2022-06-15 RX ORDER — PREDNISONE 20 MG/1
40 TABLET ORAL DAILY
Qty: 6 TABLET | Refills: 0 | Status: SHIPPED | OUTPATIENT
Start: 2022-06-15 | End: 2022-06-18

## 2022-06-15 RX ADMIN — DEXAMETHASONE SODIUM PHOSPHATE 10 MG: 10 INJECTION, SOLUTION INTRAMUSCULAR; INTRAVENOUS at 21:16

## 2022-06-15 ASSESSMENT — PAIN DESCRIPTION - LOCATION: LOCATION: NECK

## 2022-06-15 ASSESSMENT — PAIN SCALES - GENERAL: PAINLEVEL_OUTOF10: 7

## 2022-06-15 ASSESSMENT — PAIN - FUNCTIONAL ASSESSMENT: PAIN_FUNCTIONAL_ASSESSMENT: 0-10

## 2022-06-16 NOTE — ED PROVIDER NOTES
[address] [place] [address] ED  EMERGENCY DEPARTMENT ENCOUNTER      Pt Name: Anu Stewart  MRN: [id number]  [place] 1995  Date of evaluation: 6/15/2022  Provider: Leon Cruz       Chief Complaint   Patient presents with    Rash     2 day history of rash, unknown origin    Neck Pain     denies known injury          HISTORY OFPRESENT ILLNESS  (Location/Symptom, Timing/Onset, Context/Setting, Quality, Duration, Modifying Factors, Severity.)   Anu Stewart is a 28 y.o. male who presents to the emergency department by private auto for evaluation of rash that started on the back of his neck 2 days ago and has now spread to his shoulders and upper back area. States he squeezed some pus out of the rash in the back of his neck yesterday. Rash itches. No difficulty swallowing. No shortness of breath. Sure if something bit him or he came in contact with something. States he has had a previous right rotator cuff injury never had surgery. States he has had increased shoulder pain over the past several days. Pain worse with certain movement. Pain is mild. Nursing Notes were reviewed. PASTMEDICAL HISTORY     Past Medical History:   Diagnosis Date    Asthma     Hypertension          SURGICAL HISTORY       Past Surgical History:   Procedure Laterality Date    FINGER SURGERY           CURRENT MEDICATIONS     Previous Medications    MULTIPLE VITAMINS-MINERALS (HAIR SKIN & NAILS ADVANCED PO)    Take 1 capsule by mouth daily       ALLERGIES     Aspirin, Ibuprofen, Iodinated diagnostic agents, and Shellfish-derived products    FAMILY HISTORY     History reviewed. No pertinent family history.        SOCIAL HISTORY       Social History     Socioeconomic History    Marital status: Single     Spouse name: None    Number of children: None    Years of education: None    Highest education level: None   Occupational History    None   Tobacco Use    Smoking status: Never Smoker    Smokeless tobacco: Never Used   Substance and Sexual Activity    Alcohol use: Yes     Comment: daily, consumes 1 liters of liquor per day    Drug use: Yes     Types: Marijuana (Weed)     Comment: occasionally uses cigar wrappers to roll joint    Sexual activity: None   Other Topics Concern    None   Social History Narrative    None     Social Determinants of Health     Financial Resource Strain:     Difficulty of Paying Living Expenses: Not on file   Food Insecurity:     Worried About Running Out of Food in the Last Year: Not on file    Violeta of Food in the Last Year: Not on file   Transportation Needs:     Lack of Transportation (Medical): Not on file    Lack of Transportation (Non-Medical): Not on file   Physical Activity:     Days of Exercise per Week: Not on file    Minutes of Exercise per Session: Not on file   Stress:     Feeling of Stress : Not on file   Social Connections:     Frequency of Communication with Friends and Family: Not on file    Frequency of Social Gatherings with Friends and Family: Not on file    Attends Pentecostalism Services: Not on file    Active Member of [address] [place] [address] or Organizations: Not on file    Attends Club or Organization Meetings: Not on file    Marital Status: Not on file   Intimate Partner Violence:     Fear of Current or Ex-Partner: Not on file    Emotionally Abused: Not on file    Physically Abused: Not on file    Sexually Abused: Not on file   Housing Stability:     Unable to Pay for Housing in the Last Year: Not on file    Number of [place] in the Last Year: Not on file    Unstable Housing in the Last Year: Not on file         REVIEW OF SYSTEMS    (2-9 systems for level 4, 10 or more for level 5)     Review of Systems   Constitutional: Positive for activity change. Musculoskeletal: Positive for arthralgias. Negative for joint swelling. Skin: Positive for rash. All other systems reviewed and are negative.     Except as noted above the remainder of the review of systems was reviewed and negative. PHYSICAL EXAM    (up to 7 for level 4, 8 or more for level 5)     ED Triage Vitals [06/15/22 2002]   BP Temp Temp src Heart Rate Resp SpO2 Height Weight   134/82 98.4 °F (36.9 °C) -- 86 18 100 % 5' 9\" (1.753 m) 178 lb (80.7 kg)       Physical Exam  Constitutional:       Appearance: Normal appearance. He is well-developed, well-groomed and normal weight. HENT:      Head: Normocephalic. Right Ear: External ear normal.      Left Ear: External ear normal.   Eyes:      Conjunctiva/sclera: Conjunctivae normal.   Pulmonary:      Effort: Pulmonary effort is normal. No respiratory distress. Musculoskeletal:      Right shoulder: Tenderness present. No swelling or crepitus. Decreased range of motion. Normal strength. Normal pulse. Cervical back: Normal range of motion. Comments: No swelling or ecchymosis of the right shoulder. He has generalized tenderness. No deformity. Pain is worse when lifting his right arm above his head. Skin:     General: Skin is warm and dry. Capillary Refill: Capillary refill takes less than 2 seconds. Findings: Erythema and rash present. Comments: Patient has multiple raised areas of erythema to the posterior neck that spreads to both shoulders scapulas and around to the chest area. Rash does not spread to the abdomen or lower back. No rash on his arms. Neurological:      Mental Status: He is alert and oriented to person, place, and time. Psychiatric:         Mood and Affect: Mood normal.         Behavior: Behavior normal.         Thought Content:  Thought content normal.         Judgment: Judgment normal.           DIAGNOSTIC RESULTS     EKG:All EKG's are interpreted by the Emergency Department Physician who either signs or Co-signs this chart in the absence of a cardiologist.        RADIOLOGY:   Non-plain film images such as CT, Ultrasound and MRI are read by theradiologist. Plain radiographic images are visualized and preliminarily interpreted by the emergency physician with the below findings:    XR SHOULDER RIGHT (MIN 2 VIEWS)    Result Date: 6/15/2022  EXAMINATION: 2 XRAY VIEWS OF THE RIGHT SHOULDER 6/15/2022 8:44 pm COMPARISON: 02/18/2014 HISTORY: ORDERING SYSTEM PROVIDED HISTORY: Right shoulder pain several weeks, denies recent injury. Does have a history of right rotator cuff injury. TECHNOLOGIST PROVIDED HISTORY: Right shoulder pain several weeks, denies recent injury. Does have a history of right rotator cuff injury. Reason for Exam: shoulder and neck pain x 2 days. no injury FINDINGS: No fracture, dislocation, or focal osseous lesion is noted. No significant soft tissue abnormality seen. No fracture or dislocation. Interpretation per the Radiologist below, if available at the time of this note:    XR SHOULDER RIGHT (MIN 2 VIEWS)   Final Result   No fracture or dislocation. EDBEDSIDE ULTRASOUND:   Performed by Haider Hogan - none    LABS:  Labs Reviewed - No data to display    All other labs were within normal range or not returned as of this dictation. EMERGENCY DEPARTMENT COURSE andDIFFERENTIAL DIAGNOSIS/MDM:   X-ray right shoulder per radiologist no fracture or dislocation. Exam shows erythematous raised rash to the back of the neck extends to the shoulder blades around the chest.  Has some erythema where he had scratched around the rash on the back of his neck. Likely small area of cellulitis. No abscess. He was given Decadron in the ED. Prescriptions written for Atarax, prednisone, Flexeril, and Keflex. Given referral to orthopedic doctor for evaluation of his shoulder pain. Also provided with [facility] same-day PCP for follow-up. Return precautions provided.     Vitals:    Vitals:    06/15/22 2002   BP: 134/82   Pulse: 86   Resp: 18   Temp: 98.4 °F (36.9 °C)   SpO2: 100%   Weight: 178 lb (80.7 kg)   Height: 5' 9\" (1.753 m) CONSULTS:  None    RES:  Procedures    FINAL IMPRESSION      1. Rash and other nonspecific skin eruption    2. Cellulitis of neck    3.  Acute pain of right shoulder          DISPOSITION/PLAN   DISPOSITION Decision To Discharge 06/15/2022 09:37:25 PM      PATIENT REFERRED TO:     See PCP on list provided for follow-up  In 1 week      MD Alex Forrester 36  [address] [facility] [phone]      As needed    [facility] ED  [address] [facility]  [phone]    If symptoms worsen      DISCHARGE MEDICATIONS:     New Prescriptions    CEPHALEXIN (KEFLEX) 500 MG CAPSULE    Take 1 capsule by mouth 3 times daily for 7 days    CYCLOBENZAPRINE (FLEXERIL) 10 MG TABLET    Take 1 tablet by mouth 3 times daily as needed for Muscle spasms    HYDROXYZINE HCL (ATARAX) 25 MG TABLET    Take 1 tablet by mouth every 6 hours as needed for Itching    PREDNISONE (DELTASONE) 20 MG TABLET    Take 2 tablets by mouth daily for 3 days     Electronically signed by JENN Mendoza 6/15/2022 at 9:45 PM            JENN Mendoza CNP  06/15/22 2140

## 2022-06-17 ENCOUNTER — HOSPITAL ENCOUNTER (EMERGENCY)
Age: 27
Discharge: HOME | End: 2022-06-17
Attending: EMERGENCY MEDICINE
Payer: MEDICAID

## 2022-06-17 VITALS
RESPIRATION RATE: 16 BRPM | HEART RATE: 81 BPM | TEMPERATURE: 98.7 F | SYSTOLIC BLOOD PRESSURE: 136 MMHG | OXYGEN SATURATION: 100 % | DIASTOLIC BLOOD PRESSURE: 86 MMHG

## 2022-06-17 DIAGNOSIS — R21 RASH AND OTHER NONSPECIFIC SKIN ERUPTION: Primary | ICD-10-CM

## 2022-06-17 DIAGNOSIS — M25.511 ACUTE PAIN OF RIGHT SHOULDER: ICD-10-CM

## 2022-06-17 PROCEDURE — 99283 EMERGENCY DEPT VISIT LOW MDM: CPT

## 2022-06-17 PROCEDURE — 6370000000 HC RX 637 (ALT 250 FOR IP): Performed by: STUDENT IN AN ORGANIZED HEALTH CARE EDUCATION/TRAINING PROGRAM

## 2022-06-17 RX ORDER — ACYCLOVIR 200 MG/1
800 CAPSULE ORAL ONCE
Status: COMPLETED | OUTPATIENT
Start: 2022-06-17 | End: 2022-06-17

## 2022-06-17 RX ORDER — ACYCLOVIR 800 MG/1
800 TABLET ORAL
Qty: 35 TABLET | Refills: 0 | Status: SHIPPED | OUTPATIENT
Start: 2022-06-17 | End: 2022-06-24

## 2022-06-17 RX ADMIN — ACYCLOVIR 800 MG: 200 CAPSULE ORAL at 18:35

## 2022-06-17 ASSESSMENT — ENCOUNTER SYMPTOMS
SHORTNESS OF BREATH: 0
BACK PAIN: 0
VOMITING: 0
ABDOMINAL PAIN: 0
CONSTIPATION: 0
NAUSEA: 0
DIARRHEA: 0

## 2022-06-17 NOTE — ED PROVIDER NOTES
I performed a history and physical examination of the patient and discussed management with the resident. I reviewed the residents note and agree with the documented findings and plan of care. Any areas of disagreement are noted on the chart. I was personally present for the key portions of any procedures. I have documented in the chart those procedures where I was not present during the key portions. I have reviewed the emergency nurses triage note. I agree with the chief complaint, past medical history, past surgical history, allergies, medications, social and family history as documented unless otherwise noted below. Documentation of the HPI, Physical Exam and Medical Decision Making performed by medical students or scribes is based on my personal performance of the HPI, PE and MDM. For Phys Assistant/ Nurse Practitioner cases/documentation I have personally evaluated this patient and have completed at least one if not all key elements of the E/M (history, physical exam, and MDM). I find the patient's history and physical exam are consistent with the NP/PA documentation. I agree with the care provided, treatment rendered, disposition and followup plan. Additional findings are as noted. Elodia Clemons MD  Attending Emergency  Physician     this patient presents with 2 specific complaints: 1. He is complaining of pain in the right shoulder which apparently is chronic secondary to an injury sustained 12 years ago. He has been diagnosed with a rotator cuff tear and has been unable to follow-up with the appropriate orthopedist for evaluation and treatment. 2.  Patient presents with complaints of a pruritic rash on his neck shoulder and chest which began 4 to 5 days ago. Should be noted the patient was evaluated at [facility] emergency department 2 days ago with the same complaints and was diagnosed with a rotator cuff injury and nonspecific rash.   He is not been taking the medications as prescribed because they have apparently had no significant effect on his symptoms. Patient denies any fevers or chills. No numbness or tingling. He denies a prior history of varicella. On examination the patient is awake and alert. He is marginally cooperative. Examination of his skin demonstrates grouped vesicles which do not cross the midline starting in the right lower posterior neck and extending out over C5-C6 dermatomal region. Shoulder demonstrates no swelling/deformity. Limited ROM secondary to discomfort. Distal sensation, strength, pulses normal in right upper extremity. Impression:Right shoulder pain, probable rotator cuff injury. HVZ right C5-6 distribution. Plan: Discharge, Rx antivirals, Ortho f/u.             Renuka Palaciso MD  06/17/22 0606

## 2022-06-17 NOTE — Clinical Note
Jarad Ortiz was seen and treated in our emergency department on 6/17/2022. He may return to school on 06/24/2022. If you have any questions or concerns, please don't hesitate to call.       Nguyen Donovan MD

## 2022-06-17 NOTE — Clinical Note
Juan Carlos Camp was seen and treated in our emergency department on 6/17/2022. He may return to school on 06/24/2022. If you have any questions or concerns, please don't hesitate to call.       Kenneth Hernandez, DO

## 2022-06-17 NOTE — Clinical Note
Preeti Penaloza was seen and treated in our emergency department on 6/17/2022. He may return to work on 06/24/2022. If you have any questions or concerns, please don't hesitate to call.       Cara Mason, DO
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
## 2022-06-21 ENCOUNTER — OFFICE VISIT (OUTPATIENT)
Dept: ORTHOPEDIC SURGERY | Age: 27
End: 2022-06-21
Payer: MEDICAID

## 2022-06-21 VITALS — HEIGHT: 69 IN | WEIGHT: 178 LBS | BODY MASS INDEX: 26.36 KG/M2

## 2022-06-21 DIAGNOSIS — M75.41 IMPINGEMENT SYNDROME OF RIGHT SHOULDER: Primary | ICD-10-CM

## 2022-06-21 DIAGNOSIS — S16.1XXA STRAIN OF NECK MUSCLE, INITIAL ENCOUNTER: ICD-10-CM

## 2022-06-21 DIAGNOSIS — M47.812 CERVICAL SPONDYLOSIS: ICD-10-CM

## 2022-06-21 PROCEDURE — 99203 OFFICE O/P NEW LOW 30 MIN: CPT | Performed by: ORTHOPAEDIC SURGERY

## 2022-06-21 PROCEDURE — 1036F TOBACCO NON-USER: CPT | Performed by: ORTHOPAEDIC SURGERY

## 2022-06-21 PROCEDURE — G8419 CALC BMI OUT NRM PARAM NOF/U: HCPCS | Performed by: ORTHOPAEDIC SURGERY

## 2022-06-21 PROCEDURE — G8427 DOCREV CUR MEDS BY ELIG CLIN: HCPCS | Performed by: ORTHOPAEDIC SURGERY
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
## 2022-07-13 ENCOUNTER — HOSPITAL ENCOUNTER (OUTPATIENT)
Dept: PHYSICAL THERAPY | Age: 27
Setting detail: THERAPIES SERIES
Discharge: HOME | End: 2022-07-13
Payer: MEDICAID

## 2022-07-13 ENCOUNTER — OFFICE VISIT (OUTPATIENT)
Dept: ORTHOPEDIC SURGERY | Age: 27
End: 2022-07-13
Payer: MEDICAID

## 2022-07-13 VITALS — HEIGHT: 69 IN | BODY MASS INDEX: 26.36 KG/M2 | WEIGHT: 178 LBS

## 2022-07-13 DIAGNOSIS — M75.41 IMPINGEMENT SYNDROME OF RIGHT SHOULDER: ICD-10-CM

## 2022-07-13 DIAGNOSIS — S62.336D CLOSED DISPLACED FRACTURE OF NECK OF FIFTH METACARPAL BONE OF RIGHT HAND WITH ROUTINE HEALING, SUBSEQUENT ENCOUNTER: Primary | ICD-10-CM

## 2022-07-13 PROCEDURE — G8419 CALC BMI OUT NRM PARAM NOF/U: HCPCS | Performed by: ORTHOPAEDIC SURGERY

## 2022-07-13 PROCEDURE — 99213 OFFICE O/P EST LOW 20 MIN: CPT | Performed by: ORTHOPAEDIC SURGERY

## 2022-07-13 PROCEDURE — 97161 PT EVAL LOW COMPLEX 20 MIN: CPT

## 2022-07-13 PROCEDURE — 1036F TOBACCO NON-USER: CPT | Performed by: ORTHOPAEDIC SURGERY

## 2022-07-13 PROCEDURE — G8427 DOCREV CUR MEDS BY ELIG CLIN: HCPCS | Performed by: ORTHOPAEDIC SURGERY

## 2022-07-13 PROCEDURE — 97110 THERAPEUTIC EXERCISES: CPT
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
## 2022-08-01 ENCOUNTER — TELEPHONE (OUTPATIENT)
Dept: ORTHOPEDIC SURGERY | Age: 27
End: 2022-08-01

## 2022-08-01 NOTE — TELEPHONE ENCOUNTER
Patient called and would like a new order for PT for [facility]- please advise and reach out to pt once to referral and order is placed so he may get his appt set up- thank you [phone]  Patient also stated that he had a referral for [facility] and they told him they would not see him and pt is wanting to know what to do next.   Thank you

## 2022-08-02 ENCOUNTER — TELEPHONE (OUTPATIENT)
Dept: ORTHOPEDIC SURGERY | Age: 27
End: 2022-08-02

## 2022-08-02 NOTE — TELEPHONE ENCOUNTER
Returned patients phone call , and he would make a pt appointment because referral still active. Patient has two referrals to  for EMG states he would call to see which he prefers.

## 2022-08-30 ENCOUNTER — TELEPHONE (OUTPATIENT)
Dept: ORTHOPEDIC SURGERY | Age: 27
End: 2022-08-30

## 2022-08-30 NOTE — TELEPHONE ENCOUNTER
Spoke with MOSHE Carter from the retain program - she states patient current PT order has now been closed as he has no showed 3 times with [facility] - for any furture services he will need a NEW referral opened. At this point do you want to order a new PT referral or see the patient? TREATING PATIENT FOR : #1 Possible cervical radiculopathy. #2 impingement syndrome right shoulder.   #3 old healed fracture right fifth metacarpal.      Last office visit 07/13/2022 - patient has EMG scheduled on 10/13/202

## 2022-09-15 ENCOUNTER — APPOINTMENT (OUTPATIENT)
Dept: GENERAL RADIOLOGY | Age: 27
End: 2022-09-15
Payer: MEDICAID

## 2022-09-15 ENCOUNTER — HOSPITAL ENCOUNTER (EMERGENCY)
Age: 27
Discharge: HOME | End: 2022-09-15
Attending: EMERGENCY MEDICINE
Payer: MEDICAID

## 2022-09-15 VITALS
WEIGHT: 173 LBS | RESPIRATION RATE: 14 BRPM | SYSTOLIC BLOOD PRESSURE: 124 MMHG | HEIGHT: 69 IN | DIASTOLIC BLOOD PRESSURE: 95 MMHG | TEMPERATURE: 98 F | HEART RATE: 76 BPM | OXYGEN SATURATION: 98 % | BODY MASS INDEX: 25.62 KG/M2

## 2022-09-15 DIAGNOSIS — N34.2 URETHRITIS: Primary | ICD-10-CM

## 2022-09-15 PROCEDURE — 6370000000 HC RX 637 (ALT 250 FOR IP): Performed by: EMERGENCY MEDICINE

## 2022-09-15 PROCEDURE — 2580000003 HC RX 258

## 2022-09-15 PROCEDURE — 87591 N.GONORRHOEAE DNA AMP PROB: CPT

## 2022-09-15 PROCEDURE — 73110 X-RAY EXAM OF WRIST: CPT

## 2022-09-15 PROCEDURE — 6360000002 HC RX W HCPCS: Performed by: EMERGENCY MEDICINE

## 2022-09-15 PROCEDURE — 73610 X-RAY EXAM OF ANKLE: CPT

## 2022-09-15 PROCEDURE — 99284 EMERGENCY DEPT VISIT MOD MDM: CPT

## 2022-09-15 PROCEDURE — 87491 CHLMYD TRACH DNA AMP PROBE: CPT

## 2022-09-15 PROCEDURE — 96372 THER/PROPH/DIAG INJ SC/IM: CPT

## 2022-09-15 RX ORDER — DOXYCYCLINE 100 MG/1
100 CAPSULE ORAL ONCE
Status: COMPLETED | OUTPATIENT
Start: 2022-09-15 | End: 2022-09-15

## 2022-09-15 RX ORDER — ACETAMINOPHEN 325 MG/1
650 TABLET ORAL ONCE
Status: COMPLETED | OUTPATIENT
Start: 2022-09-15 | End: 2022-09-15

## 2022-09-15 RX ORDER — CEFTRIAXONE 500 MG/1
500 INJECTION, POWDER, FOR SOLUTION INTRAMUSCULAR; INTRAVENOUS ONCE
Status: COMPLETED | OUTPATIENT
Start: 2022-09-15 | End: 2022-09-15

## 2022-09-15 RX ORDER — DOXYCYCLINE HYCLATE 100 MG
100 TABLET ORAL 2 TIMES DAILY
Qty: 14 TABLET | Refills: 0 | Status: SHIPPED | OUTPATIENT
Start: 2022-09-15 | End: 2022-09-22

## 2022-09-15 RX ADMIN — ACETAMINOPHEN 650 MG: 325 TABLET, FILM COATED ORAL at 01:22

## 2022-09-15 RX ADMIN — CEFTRIAXONE SODIUM 500 MG: 500 INJECTION, POWDER, FOR SOLUTION INTRAMUSCULAR; INTRAVENOUS at 01:23

## 2022-09-15 RX ADMIN — WATER: 1 INJECTION INTRAMUSCULAR; INTRAVENOUS; SUBCUTANEOUS at 01:23

## 2022-09-15 RX ADMIN — DOXYCYCLINE 100 MG: 100 CAPSULE ORAL at 01:22

## 2022-09-15 ASSESSMENT — PAIN DESCRIPTION - ONSET: ONSET: AWAKENED FROM SLEEP

## 2022-09-15 ASSESSMENT — PAIN - FUNCTIONAL ASSESSMENT: PAIN_FUNCTIONAL_ASSESSMENT: 0-10

## 2022-09-15 ASSESSMENT — LIFESTYLE VARIABLES: HOW MANY STANDARD DRINKS CONTAINING ALCOHOL DO YOU HAVE ON A TYPICAL DAY: 3 OR 4

## 2022-09-15 ASSESSMENT — PAIN SCALES - GENERAL
PAINLEVEL_OUTOF10: 6
PAINLEVEL_OUTOF10: 3

## 2022-09-15 ASSESSMENT — PAIN DESCRIPTION - DESCRIPTORS: DESCRIPTORS: DISCOMFORT;NAGGING

## 2022-09-15 NOTE — ED PROVIDER NOTES
[facility] EMERGENCY DEPARTMENT ENCOUNTER    Pt Name: Rajni Diggs  MRN: [id number]  [place] 1995  Date of evaluation: 9/15/22  CHIEF COMPLAINT       Chief Complaint   Patient presents with    Wrist Pain     Bilateral wrist pain left worse than right starting 3 days ago woke up in pain. Foot Pain     Pain from bottom of right heel to ankle left sided. Exposure to STD     Exposure to chlamydia     HISTORY OF PRESENT ILLNESS   The history is provided by the patient. Sexually Transmitted Diseases  This is a new problem. The current episode started less than 1 hour ago. The problem occurs constantly. The problem has not changed since onset. Nothing aggravates the symptoms. Nothing relieves the symptoms. Patient notes he was told that he was exposed to chlamydia. Patient has had unprotected sex during the exposure  REVIEW OF SYSTEMS     Review of Systems   All other systems reviewed and are negative. PASTMEDICAL HISTORY     Past Medical History:   Diagnosis Date    Asthma     Bipolar 1 disorder, depressed ([facility] [address]     Borderline diabetic     Hypertension      Past Problem List  There is no problem list on file for this patient. SURGICAL HISTORY       Past Surgical History:   Procedure Laterality Date    FINGER SURGERY       CURRENT MEDICATIONS       Previous Medications    HYDROXYZINE HCL (ATARAX) 25 MG TABLET    Take 1 tablet by mouth every 6 hours as needed for Itching    MULTIPLE VITAMINS-MINERALS (HAIR SKIN & NAILS ADVANCED PO)    Take 1 capsule by mouth daily     ALLERGIES     is allergic to aspirin, ibuprofen, iodinated diagnostic agents, and shellfish-derived products. FAMILY HISTORY     has no family status information on file.       SOCIAL HISTORY       Social History     Tobacco Use    Smoking status: Never    Smokeless tobacco: Never   Vaping Use    Vaping Use: Never used   Substance Use Topics    Alcohol use: Yes     Comment: daily, consumes 1 liters of liquor per day    Drug use: Yes     Types: Marijuana (Weed)     Comment: occasionally uses cigar wrappers to roll joint     PHYSICAL EXAM     INITIAL VITALS: BP (!) 124/95   Pulse 76   Temp 98 °F (36.7 °C) (Oral)   Resp 14   Ht 5' 9\" (1.753 m)   Wt 173 lb (78.5 kg)   SpO2 98%   BMI 25.55 kg/m²    Physical Exam  Constitutional:       General: He is not in acute distress. Appearance: Normal appearance. He is well-developed. He is not diaphoretic. HENT:      Head: Normocephalic and atraumatic. Right Ear: External ear normal.      Left Ear: External ear normal.      Nose: Nose normal. No congestion. Mouth/Throat:      Mouth: Mucous membranes are moist.      Pharynx: Oropharynx is clear. Eyes:      General:         Right eye: No discharge. Left eye: No discharge. Conjunctiva/sclera: Conjunctivae normal.      Pupils: Pupils are equal, round, and reactive to light. Neck:      Trachea: No tracheal deviation. Cardiovascular:      Rate and Rhythm: Normal rate and regular rhythm. Pulses: Normal pulses. Heart sounds: Normal heart sounds. Pulmonary:      Effort: Pulmonary effort is normal. No respiratory distress. Breath sounds: Normal breath sounds. No stridor. No wheezing or rales. Abdominal:      Palpations: Abdomen is soft. Tenderness: There is no abdominal tenderness. There is no guarding or rebound. Musculoskeletal:         General: No tenderness or deformity. Normal range of motion. Cervical back: Normal range of motion and neck supple. Skin:     General: Skin is warm and dry. Capillary Refill: Capillary refill takes less than 2 seconds. Findings: No erythema or rash. Neurological:      General: No focal deficit present. Mental Status: He is alert and oriented to person, place, and time. Coordination: Coordination normal.   Psychiatric:         Mood and Affect: Mood normal.         Behavior: Behavior normal.         Thought Content:  Thought content normal. Judgment: Judgment normal.       MEDICAL DECISION MAKING:     Patient was treated for urethritis with ceftriaxone and prescription for doxycycline. Patient also noted he had pain in his wrist and ankle so x-rays were performed and pain control was provided. X-rays were negative. CRITICAL CARE:       PROCEDURES:    Procedures    DIAGNOSTIC RESULTS   EKG:All EKG's are interpreted by the Emergency Department Physician who either signs or Co-signs this chart in the absence of a cardiologist.        RADIOLOGY:All plain film, CT, MRI, and formal ultrasound images (except ED bedside ultrasound) are read by the radiologist, see reports below, unless otherwisenoted in MDM or here. XR WRIST LEFT (MIN 3 VIEWS)   Final Result   Negative exam         XR ANKLE RIGHT (MIN 3 VIEWS)   Final Result   No acute osseous abnormality           LABS: All lab results were reviewed by myself, and all abnormals are listed below. Labs Reviewed   C.TRACHOMATIS N.GONORRHOEAE DNA, URINE       EMERGENCY DEPARTMENTCOURSE:         Vitals:    Vitals:    09/15/22 0056   BP: (!) 124/95   Pulse: 76   Resp: 14   Temp: 98 °F (36.7 °C)   TempSrc: Oral   SpO2: 98%   Weight: 173 lb (78.5 kg)   Height: 5' 9\" (1.753 m)       The patient was given the following medications while in the emergency department:  Orders Placed This Encounter   Medications    acetaminophen (TYLENOL) tablet 650 mg    cefTRIAXone (ROCEPHIN) injection 500 mg     Order Specific Question:   Antimicrobial Indications     Answer:   STD infection    doxycycline monohydrate (MONODOX) capsule 100 mg     Order Specific Question:   Antimicrobial Indications     Answer:   STD infection    sterile water injection     Sisto Estrin: cabinet override    doxycycline hyclate (VIBRA-TABS) 100 MG tablet     Sig: Take 1 tablet by mouth 2 times daily for 7 days     Dispense:  14 tablet     Refill:  0     CONSULTS:  None    FINAL IMPRESSION      1.  Urethritis          DISPOSITION/PLAN DISPOSITION Decision To Discharge 09/15/2022 01:06:32 AM      PATIENT REFERRED TO:  Your Primary Care Doctor    In 1 day      DISCHARGE MEDICATIONS:  New Prescriptions    DOXYCYCLINE HYCLATE (VIBRA-TABS) 100 MG TABLET    Take 1 tablet by mouth 2 times daily for 7 days     The care is provided during an unprecedented national emergency due to the novel coronavirus, COVID [facility], [address] [place] [address],   09/15/22 0413

## 2022-09-15 NOTE — ED TRIAGE NOTES
Pt was exposed to chlamydia and reports he needs to be test. He also comes to the ER with bilateral wrist pain that started 3 days ago upon waking up and pain in right foot from heel to ankle on left side of the right foot after tripping in a hole in the yard.

## 2022-09-16 LAB
C. TRACHOMATIS DNA ,URINE: ABNORMAL
N. GONORRHOEAE DNA, URINE: NEGATIVE
SPECIMEN DESCRIPTION: ABNORMAL

## 2022-09-16 NOTE — PROGRESS NOTES
Pharmacy Note:    The patient is positive for chlamydia. The patient was given a prescription for doxycycline during encounter. Contacted patient at preferred number and discussed test results and treatment plan. The patient confirms he has filled and started taking his doxycycline. Advised patient to avoid sexual activity until treatment course is complete. Also advised asking sexual partners to be tested and treated. Patient expressed understanding.       Thank you,  Asif Nelson, PharmD, BCPS  [phone]

## 2022-10-14 ENCOUNTER — TELEPHONE (OUTPATIENT)
Dept: ORTHOPEDIC SURGERY | Age: 27
End: 2022-10-14

## 2022-10-14 NOTE — TELEPHONE ENCOUNTER
Per Dr. Simpson Scale on 10/14/22 patient was sent a dismissal letter for being non compliant with office appointments and testing.

## 2023-03-29 ENCOUNTER — OFFICE VISIT (OUTPATIENT)
Dept: ORTHOPEDIC SURGERY | Age: 28
End: 2023-03-29

## 2023-03-29 VITALS — WEIGHT: 178 LBS | BODY MASS INDEX: 26.36 KG/M2 | HEIGHT: 69 IN

## 2023-03-29 DIAGNOSIS — S54.01XA DAMAGE TO RIGHT ULNAR NERVE, INITIAL ENCOUNTER: ICD-10-CM

## 2023-03-29 DIAGNOSIS — M79.641 RIGHT HAND PAIN: Primary | ICD-10-CM
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
## 2023-03-29 NOTE — LETTER
March 29, 2023       [address] [facility] YOB: 1995   [address]  Date of Visit:  3/29/2023       To Whom It May Concern: It is my medical opinion that Marilin Reynolds may return to work on 03/29/2023 with the restrictions of LEFT HAND WORK ONLY, NO USE OF RIGHT HAND UNTIL FURTHER EVALUATED BY A PHYSICIAN. If you have any questions or concerns, please don't hesitate to call.     Sincerely,        Lul Boykin, DO

## 2023-04-27 ENCOUNTER — OFFICE VISIT (OUTPATIENT)
Dept: NEUROLOGY | Age: 28
End: 2023-04-27
Payer: MEDICAID

## 2023-04-27 VITALS
BODY MASS INDEX: 25.59 KG/M2 | HEART RATE: 71 BPM | DIASTOLIC BLOOD PRESSURE: 80 MMHG | WEIGHT: 172.8 LBS | SYSTOLIC BLOOD PRESSURE: 127 MMHG | HEIGHT: 69 IN

## 2023-04-27 DIAGNOSIS — M25.511 CHRONIC RIGHT SHOULDER PAIN: ICD-10-CM

## 2023-04-27 DIAGNOSIS — G89.29 CHRONIC RIGHT SHOULDER PAIN: ICD-10-CM

## 2023-04-27 DIAGNOSIS — G54.0 BRACHIAL PLEXUS DISORDERS: Primary | ICD-10-CM

## 2023-04-27 PROCEDURE — 99205 OFFICE O/P NEW HI 60 MIN: CPT | Performed by: PSYCHIATRY & NEUROLOGY

## 2023-04-27 RX ORDER — GABAPENTIN 300 MG/1
300 CAPSULE ORAL DAILY
COMMUNITY
Start: 2023-03-20

## 2023-04-27 NOTE — PROGRESS NOTES
Epilepsy Maternal Aunt     Mult Sclerosis Maternal Aunt     Mult Sclerosis Maternal Aunt     Mult Sclerosis Paternal Aunt     Migraines Paternal Aunt     Epilepsy Paternal Aunt     Stroke Maternal Grandmother     Neuropathy Maternal Grandmother     Dementia Maternal Grandmother     Cerebral Aneurysm Maternal Grandfather     Neuropathy Paternal Grandmother     Dementia Paternal Grandmother     Alzheimer's Disease Neg Hx     Parkinsonism Neg Hx       Social History     Socioeconomic History    Marital status: Single     Spouse name: Not on file    Number of children: Not on file    Years of education: Not on file    Highest education level: Not on file   Occupational History    Not on file   Tobacco Use    Smoking status: Never     Passive exposure: Never    Smokeless tobacco: Never   Vaping Use    Vaping Use: Never used   Substance and Sexual Activity    Alcohol use: Yes     Comment: daily, consumes 1 liters of liquor per day    Drug use: Yes     Types: Marijuana (Weed)     Comment: occasionally uses cigar wrappers to roll joint    Sexual activity: Yes     Partners: Female   Other Topics Concern    Not on file   Social History Narrative    Not on file     Social Determinants of Health     Financial Resource Strain: Not on file   Food Insecurity: Not on file   Transportation Needs: Not on file   Physical Activity: Not on file   Stress: Not on file   Social Connections: Not on file   Intimate Partner Violence: Not on file   Housing Stability: Not on file      /80 (Site: Right Upper Arm, Position: Sitting, Cuff Size: Medium Adult)   Pulse 71   Ht 5' 9\" (1.753 m)   Wt 172 lb 12.8 oz (78.4 kg)   BMI 25.52 kg/m²      General examination:    Head: Normocephalic, atraumatic  Eyes: Extraocular movements intact  Lungs: Respirations unlabored, chest wall no deformity  ENT: Normal external ear canals, no sinus tenderness  Heart: Regular rate rhythm  Abdomen: No masses, tenderness  Extremities: No cyanosis or edema,

## 2023-05-15 ENCOUNTER — HOSPITAL ENCOUNTER (OUTPATIENT)
Dept: PHYSICAL THERAPY | Facility: CLINIC | Age: 28
Setting detail: THERAPIES SERIES
Discharge: HOME | End: 2023-05-15
Payer: MEDICAID

## 2023-05-15 PROCEDURE — 97162 PT EVAL MOD COMPLEX 30 MIN: CPT
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
## 2023-05-16 ENCOUNTER — HOSPITAL ENCOUNTER (OUTPATIENT)
Dept: MRI IMAGING | Age: 28
Discharge: HOME | End: 2023-05-18
Payer: MEDICAID

## 2023-05-16 DIAGNOSIS — G54.0 BRACHIAL PLEXUS DISORDERS: ICD-10-CM

## 2023-05-16 LAB
CREAT SERPL-MCNC: 1.02 MG/DL (ref 0.7–1.2)
GFR SERPL CREATININE-BSD FRML MDRD: >60 ML/MIN/1.73M2

## 2023-05-16 PROCEDURE — A9579 GAD-BASE MR CONTRAST NOS,1ML: HCPCS | Performed by: PSYCHIATRY & NEUROLOGY

## 2023-05-16 PROCEDURE — 73220 MRI UPPR EXTREMITY W/O&W/DYE: CPT

## 2023-05-16 PROCEDURE — 6360000004 HC RX CONTRAST MEDICATION: Performed by: PSYCHIATRY & NEUROLOGY

## 2023-05-16 PROCEDURE — 36415 COLL VENOUS BLD VENIPUNCTURE: CPT

## 2023-05-16 PROCEDURE — 82565 ASSAY OF CREATININE: CPT

## 2023-05-16 PROCEDURE — 2580000003 HC RX 258: Performed by: PSYCHIATRY & NEUROLOGY

## 2023-05-16 RX ORDER — SODIUM CHLORIDE 0.9 % (FLUSH) 0.9 %
10 SYRINGE (ML) INJECTION ONCE
Status: COMPLETED | OUTPATIENT
Start: 2023-05-16 | End: 2023-05-16

## 2023-05-16 RX ADMIN — SODIUM CHLORIDE, PRESERVATIVE FREE 10 ML: 5 INJECTION INTRAVENOUS at 13:17

## 2023-05-16 RX ADMIN — GADOTERIDOL 16 ML: 279.3 INJECTION, SOLUTION INTRAVENOUS at 13:17

## 2023-06-27 ENCOUNTER — OFFICE VISIT (OUTPATIENT)
Dept: NEUROLOGY | Age: 28
End: 2023-06-27
Payer: MEDICAID

## 2023-06-27 VITALS
DIASTOLIC BLOOD PRESSURE: 80 MMHG | BODY MASS INDEX: 25.62 KG/M2 | HEART RATE: 52 BPM | WEIGHT: 173 LBS | HEIGHT: 69 IN | SYSTOLIC BLOOD PRESSURE: 125 MMHG

## 2023-06-27 DIAGNOSIS — R29.898 SHOULDER WEAKNESS: Primary | ICD-10-CM

## 2023-06-27 DIAGNOSIS — M25.511 CHRONIC RIGHT SHOULDER PAIN: ICD-10-CM

## 2023-06-27 DIAGNOSIS — G89.29 CHRONIC RIGHT SHOULDER PAIN: ICD-10-CM

## 2023-06-27 PROCEDURE — 99214 OFFICE O/P EST MOD 30 MIN: CPT | Performed by: PSYCHIATRY & NEUROLOGY

## 2023-06-27 RX ORDER — CYCLOBENZAPRINE HCL 10 MG
TABLET ORAL
COMMUNITY
Start: 2023-05-19

## 2023-06-27 RX ORDER — GABAPENTIN 300 MG/1
300 CAPSULE ORAL 3 TIMES DAILY
Qty: 270 CAPSULE | Refills: 1 | Status: SHIPPED | OUTPATIENT
Start: 2023-06-27 | End: 2023-12-24

## 2023-07-17 ENCOUNTER — HOSPITAL ENCOUNTER (OUTPATIENT)
Dept: PHYSICAL THERAPY | Facility: CLINIC | Age: 28
Setting detail: THERAPIES SERIES
Discharge: HOME | End: 2023-07-17
Payer: MEDICAID

## 2023-07-17 PROCEDURE — 97110 THERAPEUTIC EXERCISES: CPT

## 2023-07-17 NOTE — FLOWSHEET NOTE
[] [address]  [address] [facility].  [phone]  F: [phone] [x] [address] [facility]  [address]   [address]  P: [phone]  F: [phone] [] [address]  [address] [facility]  P: [phone]  F: [phone] [] [place]: [phone]  F: [phone] [] [address] [employer]  [address] [facility]   [address]   P: [phone]  F: [phone]  [] [address] [facility].   P: [phone]  F: [phone] [] [address] [facility]  [address]  P: [phone]  F: [phone] [] [address] [facility]  [address]  [place]: [phone]  F: [phone] [] [address] [facility] [address]  [place]: [phone]  F: [phone]      Physical Therapy Daily Treatment Note    Date:  2023  Patient Name:  Sheryl Dahl    :  1995  MRN: [id number]  Physician: Nancy Galvez MD                         Insurance: [facility] After [id number])  Medical Diagnosis: G54.0 (ICD-10-CM) - Brachial plexus disorders  Rehab Codes: O62.916, M25.611, M62.81, R20.2  Onset date: 22                 Next 's appt.: 23     Visit# / total visits: 2/ 8  Cancels/No Shows: 0/0      Subjective:    Pain:  [x] Yes  [] No Location: R shoulder  Pain Rating: (0-10 scale) 6/10  Pain altered Tx:  [x] No  [] Yes  Action:  Comments: Pt arrived to physical therapy with c/o R shoulder pain rated 6/10 with constant numbness/tingling in R hand/fingers.   Pt reported had MRI of brachial plexus and

## 2023-07-20 ENCOUNTER — HOSPITAL ENCOUNTER (OUTPATIENT)
Dept: PHYSICAL THERAPY | Facility: CLINIC | Age: 28
Setting detail: THERAPIES SERIES
Discharge: HOME | End: 2023-07-20
Payer: MEDICAID

## 2023-07-20 NOTE — FLOWSHEET NOTE
[] [address]  46[address] [facility].    [phone]  F: [phone]   [x] [address] [facility]  [address] [facility]   [address]  P: [phone]  F: [phone]  [] [address]  [address] [facility]  P: [phone]  F: [phone] [] [place]  P: [phone]  F: [phone]  [] [address] [facility]  [address] [facility] [address] [facility]   [address]   [place]: [phone]  F: [phone]   [] [address] [facility]  [address] [facility] [address]  [place]: [phone]   F: [phone]     Physical Therapy Cancel/No Show note    Date: 2023  Patient: Janett Pereira  : 1995  MRN: [id number]    Cancels/No Shows to date:     For today's appointment patient:    [x]  Cancelled    [] Rescheduled appointment    [] No-show     Reason given by patient:    [x]  Patient ill    []  Conflicting appointment    [] No transportation      [] Conflict with work    [] No reason given    [] Weather related    [] [id number]    [] Other:      Comments:        [x] Next appointment was confirmed 23    Electronically signed by: Vadim Lowery PTA

## 2023-09-28 ENCOUNTER — HOSPITAL ENCOUNTER (OUTPATIENT)
Dept: NEUROLOGY | Age: 28
Discharge: HOME | End: 2023-09-28
Attending: PSYCHIATRY & NEUROLOGY
Payer: MEDICAID

## 2023-09-28 DIAGNOSIS — R29.898 SHOULDER WEAKNESS: ICD-10-CM

## 2023-09-28 PROCEDURE — 95886 MUSC TEST DONE W/N TEST COMP: CPT | Performed by: PHYSICAL MEDICINE & REHABILITATION

## 2023-09-28 PROCEDURE — 95913 NRV CNDJ TEST 13/> STUDIES: CPT | Performed by: PHYSICAL MEDICINE & REHABILITATION

## 2023-10-03 ENCOUNTER — OFFICE VISIT (OUTPATIENT)
Dept: NEUROLOGY | Age: 28
End: 2023-10-03
Payer: MEDICAID

## 2023-10-03 VITALS
HEART RATE: 52 BPM | SYSTOLIC BLOOD PRESSURE: 117 MMHG | HEIGHT: 69 IN | DIASTOLIC BLOOD PRESSURE: 80 MMHG | BODY MASS INDEX: 25.92 KG/M2 | WEIGHT: 175 LBS

## 2023-10-03 DIAGNOSIS — R29.898 SHOULDER WEAKNESS: Primary | ICD-10-CM

## 2023-10-03 DIAGNOSIS — M54.12 CERVICAL RADICULOPATHY: ICD-10-CM

## 2023-10-03 DIAGNOSIS — G56.01 CARPAL TUNNEL SYNDROME OF RIGHT WRIST: ICD-10-CM

## 2023-10-03 DIAGNOSIS — M25.511 CHRONIC RIGHT SHOULDER PAIN: ICD-10-CM

## 2023-10-03 DIAGNOSIS — G89.29 CHRONIC RIGHT SHOULDER PAIN: ICD-10-CM

## 2023-10-03 PROCEDURE — G8484 FLU IMMUNIZE NO ADMIN: HCPCS | Performed by: PSYCHIATRY & NEUROLOGY

## 2023-10-03 PROCEDURE — G8419 CALC BMI OUT NRM PARAM NOF/U: HCPCS | Performed by: PSYCHIATRY & NEUROLOGY

## 2023-10-03 PROCEDURE — 1036F TOBACCO NON-USER: CPT | Performed by: PSYCHIATRY & NEUROLOGY

## 2023-10-03 PROCEDURE — 99215 OFFICE O/P EST HI 40 MIN: CPT | Performed by: PSYCHIATRY & NEUROLOGY

## 2023-10-03 PROCEDURE — G8427 DOCREV CUR MEDS BY ELIG CLIN: HCPCS | Performed by: PSYCHIATRY & NEUROLOGY

## 2023-10-03 RX ORDER — CYCLOBENZAPRINE HCL 10 MG
10 TABLET ORAL 3 TIMES DAILY PRN
Qty: 30 TABLET | Refills: 3 | Status: SHIPPED | OUTPATIENT
Start: 2023-10-03
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
## 2023-12-02 ENCOUNTER — HOSPITAL ENCOUNTER (EMERGENCY)
Age: 28
Discharge: HOME | End: 2023-12-03
Attending: EMERGENCY MEDICINE
Payer: OTHER MISCELLANEOUS

## 2023-12-02 VITALS
WEIGHT: 175 LBS | DIASTOLIC BLOOD PRESSURE: 94 MMHG | BODY MASS INDEX: 25.92 KG/M2 | TEMPERATURE: 98.1 F | RESPIRATION RATE: 18 BRPM | HEART RATE: 74 BPM | SYSTOLIC BLOOD PRESSURE: 123 MMHG | OXYGEN SATURATION: 99 % | HEIGHT: 69 IN

## 2023-12-02 DIAGNOSIS — S02.2XXA CLOSED FRACTURE OF NASAL BONE, INITIAL ENCOUNTER: ICD-10-CM
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
## 2023-12-02 DIAGNOSIS — M25.511 ACUTE PAIN OF RIGHT SHOULDER: ICD-10-CM

## 2023-12-02 PROCEDURE — 99284 EMERGENCY DEPT VISIT MOD MDM: CPT

## 2023-12-02 ASSESSMENT — ENCOUNTER SYMPTOMS
ABDOMINAL PAIN: 0
DIARRHEA: 0
PHOTOPHOBIA: 0
TROUBLE SWALLOWING: 0
COLOR CHANGE: 0
SHORTNESS OF BREATH: 0
COUGH: 0
NAUSEA: 0
VOMITING: 0

## 2023-12-02 ASSESSMENT — LIFESTYLE VARIABLES
HOW OFTEN DO YOU HAVE A DRINK CONTAINING ALCOHOL: NEVER
HOW MANY STANDARD DRINKS CONTAINING ALCOHOL DO YOU HAVE ON A TYPICAL DAY: PATIENT DOES NOT DRINK

## 2023-12-03 ENCOUNTER — APPOINTMENT (OUTPATIENT)
Dept: CT IMAGING | Age: 28
End: 2023-12-03
Payer: OTHER MISCELLANEOUS

## 2023-12-03 ENCOUNTER — APPOINTMENT (OUTPATIENT)
Dept: GENERAL RADIOLOGY | Age: 28
End: 2023-12-03
Payer: OTHER MISCELLANEOUS

## 2023-12-03 PROCEDURE — 73030 X-RAY EXAM OF SHOULDER: CPT

## 2023-12-03 PROCEDURE — 73502 X-RAY EXAM HIP UNI 2-3 VIEWS: CPT

## 2023-12-03 PROCEDURE — 70450 CT HEAD/BRAIN W/O DYE: CPT

## 2023-12-03 PROCEDURE — 93005 ELECTROCARDIOGRAM TRACING: CPT

## 2023-12-03 PROCEDURE — 71045 X-RAY EXAM CHEST 1 VIEW: CPT

## 2023-12-03 PROCEDURE — 70486 CT MAXILLOFACIAL W/O DYE: CPT

## 2023-12-03 PROCEDURE — 73100 X-RAY EXAM OF WRIST: CPT

## 2023-12-03 PROCEDURE — 72125 CT NECK SPINE W/O DYE: CPT

## 2023-12-03 PROCEDURE — 73560 X-RAY EXAM OF KNEE 1 OR 2: CPT

## 2023-12-03 PROCEDURE — 73630 X-RAY EXAM OF FOOT: CPT

## 2023-12-03 NOTE — ED PROVIDER NOTES
MD  [address] [facility] [place] [address]  [phone]    Schedule an appointment as soon as possible for a visit in 2 days      [facility] ED  [address] [address] [phone]  Go to   As needed, If symptoms worsen      DO Charisse Meier Sami, DO  12/03/23 4787

## 2023-12-04 ENCOUNTER — NURSE TRIAGE (OUTPATIENT)
Dept: OTHER | Facility: CLINIC | Age: 28
End: 2023-12-04
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
## 2023-12-07 LAB
EKG ATRIAL RATE: 61 BPM
EKG P AXIS: 60 DEGREES
EKG P-R INTERVAL: 180 MS
EKG Q-T INTERVAL: 388 MS
EKG QRS DURATION: 94 MS
EKG QTC CALCULATION (BAZETT): 390 MS
EKG R AXIS: 71 DEGREES
EKG T AXIS: 24 DEGREES
EKG VENTRICULAR RATE: 61 BPM

## 2023-12-11 SDOH — HEALTH STABILITY: PHYSICAL HEALTH
ON AVERAGE, HOW MANY DAYS PER WEEK DO YOU ENGAGE IN MODERATE TO STRENUOUS EXERCISE (LIKE A BRISK WALK)?: PATIENT DECLINED

## 2023-12-14 ENCOUNTER — OFFICE VISIT (OUTPATIENT)
Dept: PRIMARY CARE CLINIC | Age: 28
End: 2023-12-14
Payer: MEDICAID

## 2023-12-14 ENCOUNTER — HOSPITAL ENCOUNTER (OUTPATIENT)
Age: 28
Discharge: HOME | End: 2023-12-14
Payer: MEDICAID

## 2023-12-14 VITALS
BODY MASS INDEX: 26.39 KG/M2 | WEIGHT: 178.2 LBS | OXYGEN SATURATION: 99 % | HEIGHT: 69 IN | HEART RATE: 60 BPM | DIASTOLIC BLOOD PRESSURE: 77 MMHG | SYSTOLIC BLOOD PRESSURE: 123 MMHG

## 2023-12-14 DIAGNOSIS — R73.03 PREDIABETES: ICD-10-CM

## 2023-12-14 DIAGNOSIS — S02.2XXG CLOSED FRACTURE OF NASAL BONE WITH DELAYED HEALING, SUBSEQUENT ENCOUNTER: ICD-10-CM

## 2023-12-14 DIAGNOSIS — Z13.9 DUE FOR SCREENING: ICD-10-CM

## 2023-12-14 DIAGNOSIS — F31.9 BIPOLAR AFFECTIVE DISORDER, REMISSION STATUS UNSPECIFIED (HCC): ICD-10-CM

## 2023-12-14 DIAGNOSIS — J45.20 MILD INTERMITTENT ASTHMA WITHOUT COMPLICATION: ICD-10-CM

## 2023-12-14 DIAGNOSIS — M25.561 ACUTE PAIN OF RIGHT KNEE: ICD-10-CM

## 2023-12-14 DIAGNOSIS — I10 PRIMARY HYPERTENSION: ICD-10-CM

## 2023-12-14 DIAGNOSIS — Z76.89 ENCOUNTER TO ESTABLISH CARE: Primary | ICD-10-CM

## 2023-12-14 LAB
ALBUMIN SERPL-MCNC: 4.3 G/DL (ref 3.5–5.2)
ALBUMIN/GLOB SERPL: 1.3 {RATIO} (ref 1–2.5)
ALP SERPL-CCNC: 66 U/L (ref 40–129)
ALT SERPL-CCNC: 12 U/L (ref 5–41)
ANION GAP SERPL CALCULATED.3IONS-SCNC: 8 MMOL/L (ref 9–17)
AST SERPL-CCNC: 19 U/L
BILIRUB SERPL-MCNC: 0.3 MG/DL (ref 0.3–1.2)
BUN SERPL-MCNC: 10 MG/DL (ref 6–20)
CALCIUM SERPL-MCNC: 9.3 MG/DL (ref 8.6–10.4)
CHLORIDE SERPL-SCNC: 105 MMOL/L (ref 98–107)
CHOLEST SERPL-MCNC: 190 MG/DL
CHOLESTEROL/HDL RATIO: 3.4
CO2 SERPL-SCNC: 28 MMOL/L (ref 20–31)
CREAT SERPL-MCNC: 1 MG/DL (ref 0.7–1.2)
ERYTHROCYTE [DISTWIDTH] IN BLOOD BY AUTOMATED COUNT: 14 % (ref 11.8–14.4)
EST. AVERAGE GLUCOSE BLD GHB EST-MCNC: 111 MG/DL
GFR SERPL CREATININE-BSD FRML MDRD: >60 ML/MIN/1.73M2
GLUCOSE SERPL-MCNC: 91 MG/DL (ref 70–99)
HBA1C MFR BLD: 5.5 % (ref 4–6)
HCT VFR BLD AUTO: 45.8 % (ref 40.7–50.3)
HCV AB SERPL QL IA: NONREACTIVE
HDLC SERPL-MCNC: 56 MG/DL
HGB BLD-MCNC: 14.6 G/DL (ref 13–17)
HIV 1+2 AB+HIV1 P24 AG SERPL QL IA: NONREACTIVE
LDLC SERPL CALC-MCNC: 125 MG/DL (ref 0–130)
MCH RBC QN AUTO: 22.4 PG (ref 25.2–33.5)
MCHC RBC AUTO-ENTMCNC: 31.9 G/DL (ref 28.4–34.8)
MCV RBC AUTO: 70.1 FL (ref 82.6–102.9)
NRBC BLD-RTO: 0 PER 100 WBC
PLATELET # BLD AUTO: 333 K/UL (ref 138–453)
PMV BLD AUTO: 11.4 FL (ref 8.1–13.5)
POTASSIUM SERPL-SCNC: 3.8 MMOL/L (ref 3.7–5.3)
PROT SERPL-MCNC: 7.5 G/DL (ref 6.4–8.3)
RBC # BLD AUTO: 6.53 M/UL (ref 4.21–5.77)
SODIUM SERPL-SCNC: 141 MMOL/L (ref 135–144)
TRIGL SERPL-MCNC: 43 MG/DL
WBC OTHER # BLD: 6.3 K/UL (ref 3.5–11.3)

## 2023-12-14 PROCEDURE — 3074F SYST BP LT 130 MM HG: CPT | Performed by: NURSE PRACTITIONER

## 2023-12-14 PROCEDURE — 87389 HIV-1 AG W/HIV-1&-2 AB AG IA: CPT

## 2023-12-14 PROCEDURE — G8427 DOCREV CUR MEDS BY ELIG CLIN: HCPCS | Performed by: NURSE PRACTITIONER

## 2023-12-14 PROCEDURE — G8419 CALC BMI OUT NRM PARAM NOF/U: HCPCS | Performed by: NURSE PRACTITIONER

## 2023-12-14 PROCEDURE — 1036F TOBACCO NON-USER: CPT | Performed by: NURSE PRACTITIONER

## 2023-12-14 PROCEDURE — 83036 HEMOGLOBIN GLYCOSYLATED A1C: CPT

## 2023-12-14 PROCEDURE — 3078F DIAST BP <80 MM HG: CPT | Performed by: NURSE PRACTITIONER

## 2023-12-14 PROCEDURE — 80061 LIPID PANEL: CPT

## 2023-12-14 PROCEDURE — 80053 COMPREHEN METABOLIC PANEL: CPT

## 2023-12-14 PROCEDURE — 36415 COLL VENOUS BLD VENIPUNCTURE: CPT

## 2023-12-14 PROCEDURE — 99204 OFFICE O/P NEW MOD 45 MIN: CPT | Performed by: NURSE PRACTITIONER

## 2023-12-14 PROCEDURE — 85027 COMPLETE CBC AUTOMATED: CPT

## 2023-12-14 PROCEDURE — G8484 FLU IMMUNIZE NO ADMIN: HCPCS | Performed by: NURSE PRACTITIONER

## 2023-12-14 PROCEDURE — 86803 HEPATITIS C AB TEST: CPT

## 2023-12-14 RX ORDER — ALBUTEROL SULFATE 90 UG/1
2 AEROSOL, METERED RESPIRATORY (INHALATION) 4 TIMES DAILY PRN
Qty: 54 G | Refills: 1 | Status: SHIPPED | OUTPATIENT
Start: 2023-12-14

## 2023-12-14 SDOH — ECONOMIC STABILITY: INCOME INSECURITY: HOW HARD IS IT FOR YOU TO PAY FOR THE VERY BASICS LIKE FOOD, HOUSING, MEDICAL CARE, AND HEATING?: SOMEWHAT HARD

## 2023-12-14 SDOH — ECONOMIC STABILITY: FOOD INSECURITY: WITHIN THE PAST 12 MONTHS, THE FOOD YOU BOUGHT JUST DIDN'T LAST AND YOU DIDN'T HAVE MONEY TO GET MORE.: SOMETIMES TRUE

## 2023-12-14 SDOH — ECONOMIC STABILITY: FOOD INSECURITY: WITHIN THE PAST 12 MONTHS, YOU WORRIED THAT YOUR FOOD WOULD RUN OUT BEFORE YOU GOT MONEY TO BUY MORE.: SOMETIMES TRUE

## 2023-12-14 SDOH — ECONOMIC STABILITY: HOUSING INSECURITY
IN THE LAST 12 MONTHS, WAS THERE A TIME WHEN YOU DID NOT HAVE A STEADY PLACE TO SLEEP OR SLEPT IN A SHELTER (INCLUDING NOW)?: NO

## 2023-12-14 ASSESSMENT — PATIENT HEALTH QUESTIONNAIRE - PHQ9
SUM OF ALL RESPONSES TO PHQ9 QUESTIONS 1 & 2: 4
SUM OF ALL RESPONSES TO PHQ QUESTIONS 1-9: 19
7. TROUBLE CONCENTRATING ON THINGS, SUCH AS READING THE NEWSPAPER OR WATCHING TELEVISION: 1
9. THOUGHTS THAT YOU WOULD BE BETTER OFF DEAD, OR OF HURTING YOURSELF: 0
8. MOVING OR SPEAKING SO SLOWLY THAT OTHER PEOPLE COULD HAVE NOTICED. OR THE OPPOSITE, BEING SO FIGETY OR RESTLESS THAT YOU HAVE BEEN MOVING AROUND A LOT MORE THAN USUAL: 2
6. FEELING BAD ABOUT YOURSELF - OR THAT YOU ARE A FAILURE OR HAVE LET YOURSELF OR YOUR FAMILY DOWN: 3
3. TROUBLE FALLING OR STAYING ASLEEP: 3
SUM OF ALL RESPONSES TO PHQ QUESTIONS 1-9: 19
2. FEELING DOWN, DEPRESSED OR HOPELESS: 3
5. POOR APPETITE OR OVEREATING: 3
SUM OF ALL RESPONSES TO PHQ QUESTIONS 1-9: 19
SUM OF ALL RESPONSES TO PHQ QUESTIONS 1-9: 19
10. IF YOU CHECKED OFF ANY PROBLEMS, HOW DIFFICULT HAVE THESE PROBLEMS MADE IT FOR YOU TO DO YOUR WORK, TAKE CARE OF THINGS AT HOME, OR GET ALONG WITH OTHER PEOPLE: 3
1. LITTLE INTEREST OR PLEASURE IN DOING THINGS: 1
4. FEELING TIRED OR HAVING LITTLE ENERGY: 3

## 2023-12-14 NOTE — PROGRESS NOTES
noted.  Standing for any prolonged period of time or ambulating causes the pain to worsen. Patient is allergic to both acetaminophen and ibuprofen and states he has been unable to use any anti-inflammatories. He has been icing occasionally, but not consistently. Did encourage the patient to use supportive compression brace which the patient states he has at home. Recommended patient follow-up with physical therapy. However, patient declines. States he is \"active enough\". He is requesting further imaging giving pain and discomfort have persisted. There is mild gait impairment on visualized ambulation. Otherwise, he is denying any concerns today. In regards to his hypertension and prediabetes. Blood pressure is well-controlled today. Patient states when he was initially diagnosed with hypertension and prediabetes, he made significant lifestyle changes with move toward a healthier diet. Healthcare maintenance measures addressed. Baseline labs have been ordered accordingly. Recommended vaccinations discussed. Patient is declining all vaccines today stating \"I do not believe in them\". Additionally, he does have some concerns with his mental health. Denies any concerning depression or anxiety. However, he does have previous diagnosis of bipolar disorder. He spent a fair amount of time incarcerated and since his release, has been noticing mood fluctuations. States he tends to isolate and can tell when he has \"lows\". We discussed both medication and counseling options. Patient significant other is with the patient today, they both agree he would benefit from counseling. Offered patient immediate referral to behavioral health here in this office along with community mental health numbers. Patient states \"if you just give me a number, I will call\". Referral to [facility] behavioral health placed accordingly.     Past Medical History:   Diagnosis Date    ADHD (attention deficit hyperactivity disorder)

## 2024-02-06 ENCOUNTER — OFFICE VISIT (OUTPATIENT)
Dept: NEUROLOGY | Age: 29
End: 2024-02-06
Payer: MEDICAID

## 2024-02-06 VITALS
BODY MASS INDEX: 26.51 KG/M2 | SYSTOLIC BLOOD PRESSURE: 110 MMHG | DIASTOLIC BLOOD PRESSURE: 70 MMHG | HEIGHT: 69 IN | HEART RATE: 71 BPM | WEIGHT: 179 LBS

## 2024-02-06 DIAGNOSIS — M25.511 CHRONIC RIGHT SHOULDER PAIN: ICD-10-CM

## 2024-02-06 DIAGNOSIS — M54.12 CERVICAL RADICULOPATHY: ICD-10-CM

## 2024-02-06 DIAGNOSIS — R29.898 SHOULDER WEAKNESS: Primary | ICD-10-CM

## 2024-02-06 DIAGNOSIS — G56.01 CARPAL TUNNEL SYNDROME OF RIGHT WRIST: ICD-10-CM

## 2024-02-06 DIAGNOSIS — G89.29 CHRONIC RIGHT SHOULDER PAIN: ICD-10-CM

## 2024-02-06 PROCEDURE — G8484 FLU IMMUNIZE NO ADMIN: HCPCS | Performed by: PSYCHIATRY & NEUROLOGY

## 2024-02-06 PROCEDURE — G8419 CALC BMI OUT NRM PARAM NOF/U: HCPCS | Performed by: PSYCHIATRY & NEUROLOGY

## 2024-02-06 PROCEDURE — 99215 OFFICE O/P EST HI 40 MIN: CPT | Performed by: PSYCHIATRY & NEUROLOGY

## 2024-02-06 PROCEDURE — 1036F TOBACCO NON-USER: CPT | Performed by: PSYCHIATRY & NEUROLOGY

## 2024-02-06 PROCEDURE — G8427 DOCREV CUR MEDS BY ELIG CLIN: HCPCS | Performed by: PSYCHIATRY & NEUROLOGY

## 2024-02-06 NOTE — PROGRESS NOTES
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     He takes weed to take the pain off and takes alcohol socially     Reviewed prior CT cervical images and no issues      Past Medical History:   Diagnosis Date    ADHD (attention deficit hyperactivity disorder)     Asthma     Bipolar 1 disorder, depressed (HCC)     Borderline diabetic     Eczema     Hypertension     PTSD (post-traumatic stress disorder)      Past Surgical History:   Procedure Laterality Date    FINGER SURGERY       Allergies   Allergen Reactions    Aspirin Shortness Of Breath    Ibuprofen Shortness Of Breath    Iodinated Contrast Media Shortness Of Breath    Shellfish-Derived Products Shortness Of Breath     Blurred vision, \"Seize up\"    Tomato      Family History   Problem Relation Age of Onset    Stroke Mother     Neuropathy Mother     Migraines Mother     Epilepsy Mother     Neuropathy Father     Migraines Father     Seizures Brother     Neuropathy Brother     Migraines Brother     Epilepsy Brother     Seizures Brother     Neuropathy Brother     Migraines Brother     Epilepsy Brother     Migraines Brother     Migraines Brother     Migraines Maternal Aunt     Epilepsy Maternal Aunt     Mult Sclerosis Maternal Aunt     Mult Sclerosis Maternal Aunt     Mult Sclerosis Paternal Aunt     Migraines Paternal Aunt     Epilepsy Paternal Aunt     Stroke Maternal Grandmother     Neuropathy Maternal Grandmother     Dementia Maternal Grandmother     Cerebral Aneurysm Maternal Grandfather     Neuropathy Paternal Grandmother     Dementia Paternal Grandmother     Alzheimer's Disease Neg Hx     Parkinsonism Neg Hx       Social History     Socioeconomic History    Marital status: Single     Spouse name: Not on file    Number of children: Not on file    Years of education: Not on file    Highest education level: Not on file

## 2024-03-29 ENCOUNTER — HOSPITAL ENCOUNTER (EMERGENCY)
Age: 29
Discharge: HOME | End: 2024-03-29
Attending: EMERGENCY MEDICINE
Payer: MEDICAID

## 2024-03-29 VITALS
DIASTOLIC BLOOD PRESSURE: 61 MMHG | OXYGEN SATURATION: 100 % | TEMPERATURE: 96 F | SYSTOLIC BLOOD PRESSURE: 102 MMHG | RESPIRATION RATE: 18 BRPM | HEART RATE: 51 BPM

## 2024-03-29 DIAGNOSIS — Z20.2 TRICHOMONAS EXPOSURE: Primary | ICD-10-CM

## 2024-03-29 LAB
CHLAMYDIA DNA UR QL NAA+PROBE: NEGATIVE
N GONORRHOEA DNA UR QL NAA+PROBE: NEGATIVE
SPECIMEN DESCRIPTION: NORMAL

## 2024-03-29 PROCEDURE — 87661 TRICHOMONAS VAGINALIS AMPLIF: CPT

## 2024-03-29 PROCEDURE — 87491 CHLMYD TRACH DNA AMP PROBE: CPT

## 2024-03-29 PROCEDURE — 6370000000 HC RX 637 (ALT 250 FOR IP)

## 2024-03-29 PROCEDURE — 87591 N.GONORRHOEAE DNA AMP PROB: CPT

## 2024-03-29 PROCEDURE — 99283 EMERGENCY DEPT VISIT LOW MDM: CPT

## 2024-03-29 RX ORDER — METRONIDAZOLE 500 MG/1
2000 TABLET ORAL ONCE
Status: COMPLETED | OUTPATIENT
Start: 2024-03-29 | End: 2024-03-29

## 2024-03-29 RX ADMIN — METRONIDAZOLE 2000 MG: 500 TABLET ORAL at 06:36

## 2024-03-29 NOTE — ED PROVIDER NOTES
[facility] ED  Emergency Department Encounter  Emergency Medicine Resident     Pt Name:Yary Chinchilla  MRN: [id number]  Birthdate 1995  Date of evaluation: 3/29/24  PCP:  Jennifer May APRN - CNP  Note Started: 10:48 AM EDT      CHIEF COMPLAINT       Chief Complaint   Patient presents with    Exposure to STD       HISTORY OF PRESENT ILLNESS  (Location/Symptom, Timing/Onset, Context/Setting, Quality, Duration, Modifying Factors, Severity.)      Yary Chinchilla is a 29 y.o. male who presents with complaints of exposure to trichomonas.  Denies any symptoms associated with exposure.  No testicular pain, rash, penile discharge, abdominal pain, vomiting, fever.    PAST MEDICAL / SURGICAL / SOCIAL / FAMILY HISTORY      has a past medical history of ADHD (attention deficit hyperactivity disorder), Asthma, Bipolar 1 disorder, depressed (HCC), Borderline diabetic, Eczema, Hypertension, and PTSD (post-traumatic stress disorder).  Reviewed with patient     has a past surgical history that includes Finger surgery.  Reviewed with patient    Social History     Socioeconomic History    Marital status: Single     Spouse name: Not on file    Number of children: Not on file    Years of education: Not on file    Highest education level: Not on file   Occupational History    Not on file   Tobacco Use    Smoking status: Never     Passive exposure: Never    Smokeless tobacco: Never   Vaping Use    Vaping Use: Never used   Substance and Sexual Activity    Alcohol use: Yes     Comment: occasionally    Drug use: Yes     Types: Marijuana (Weed)     Comment: occasionally uses cigar wrappers to roll joint    Sexual activity: Yes     Partners: Female   Other Topics Concern    Not on file   Social History Narrative    Not on file     Social Determinants of Health     Financial Resource Strain: Medium Risk (12/14/2023)    Overall Financial Resource Strain (CARDIA)     Difficulty of Paying Living Expenses: Somewhat hard   Food  Insecurity: Food Insecurity Present (12/14/2023)    Hunger Vital Sign     Worried About Running Out of Food in the Last Year: Sometimes true     Ran Out of Food in the Last Year: Sometimes true   Transportation Needs: Unmet Transportation Needs (12/14/2023)    PRAPARE - Transportation     Lack of Transportation (Medical): Not on file     Lack of Transportation (Non-Medical): Yes   Physical Activity: Unknown (12/11/2023)    Exercise Vital Sign     Days of Exercise per Week: Patient declined     Minutes of Exercise per Session: Not on file   Stress: Not on file   Social Connections: Not on file   Intimate Partner Violence: Not on file   Housing Stability: Unknown (12/14/2023)    Housing Stability Vital Sign     Unable to Pay for Housing in the Last Year: Not on file     Number of Places Lived in the Last Year: Not on file     Unstable Housing in the Last Year: No       Family History   Problem Relation Age of Onset    Stroke Mother     Neuropathy Mother     Migraines Mother     Epilepsy Mother     Neuropathy Father     Migraines Father     Seizures Brother     Neuropathy Brother     Migraines Brother     Epilepsy Brother     Seizures Brother     Neuropathy Brother     Migraines Brother     Epilepsy Brother     Migraines Brother     Migraines Brother     Migraines Maternal Aunt     Epilepsy Maternal Aunt     Mult Sclerosis Maternal Aunt     Mult Sclerosis Maternal Aunt     Mult Sclerosis Paternal Aunt     Migraines Paternal Aunt     Epilepsy Paternal Aunt     Stroke Maternal Grandmother     Neuropathy Maternal Grandmother     Dementia Maternal Grandmother     Cerebral Aneurysm Maternal Grandfather     Neuropathy Paternal Grandmother     Dementia Paternal Grandmother     Alzheimer's Disease Neg Hx     Parkinsonism Neg Hx        Allergies:  Aspirin, Ibuprofen, Iodinated contrast media, Shellfish-derived products, and Tomato    Home Medications:  Prior to Admission medications    Medication Sig Start Date End Date Taking?

## 2024-03-29 NOTE — ED PROVIDER NOTES
[facility]     Emergency Department     Faculty Attestation    I performed a history and physical examination of the patient and discussed management with the resident. I have reviewed and agree with the resident’s findings including all diagnostic interpretations, and treatment plans as written. Any areas of disagreement are noted on the chart. I was personally present for the key portions of any procedures. I have documented in the chart those procedures where I was not present during the key portions. I have reviewed the emergency nurses triage note. I agree with the chief complaint, past medical history, past surgical history, allergies, medications, social and family history as documented unless otherwise noted below. Documentation of the HPI, Physical Exam and Medical Decision Making performed by [email or web address] is based on my personal performance of the HPI, PE and MDM. For Physician Assistant/ Nurse Practitioner cases/documentation I have personally evaluated this patient and have completed at least one if not all key elements of the E/M (history, physical exam, and MDM). Additional findings are as noted.    Note Started: 6:28 AM EDT     28 yo M c/o trich exposure, no pain, no fever, no vomiting, no penile discharge  PE vss gcs 15 abdomen nontender, no distention, no rigidity, no rebound, no guarding    Treating    EKG Interpretation    Interpreted by me      CRITICAL CARE: There was a high probability of clinically significant/life threatening deterioration in this patient's condition which required my urgent intervention.  Total critical care time was 0 minutes.  This excludes any time for separately reportable procedures.       Dylan Alvarez DO  03/29/24 0628

## 2024-03-29 NOTE — DISCHARGE INSTRUCTIONS
Thank you for visiting [facility] Emergency Department.    You need to call Jennifer May APRN - CNP to make an appointment as directed for follow up.    Should you have any questions regarding your care or further treatment, please call [facility] Emergency Department at [phone].    Please return to emergency department for any new or worrisome symptoms including any penile discharge, rash, abdominal pain, vomiting, fever.  Please abstain from sexual intercourse while you are being tested for STD.  Your results should be available on Xeebel within the next 24 to 48 hours

## 2024-04-03 LAB
SOURCE: NORMAL
TRICHOMONAS VAGINALI, MOLECULAR: NEGATIVE

## 2024-04-29 ENCOUNTER — OFFICE VISIT (OUTPATIENT)
Dept: PRIMARY CARE CLINIC | Age: 29
End: 2024-04-29
Payer: MEDICAID

## 2024-04-29 ENCOUNTER — HOSPITAL ENCOUNTER (OUTPATIENT)
Age: 29
Discharge: HOME | End: 2024-04-29
Payer: MEDICAID

## 2024-04-29 VITALS
DIASTOLIC BLOOD PRESSURE: 74 MMHG | HEIGHT: 69 IN | OXYGEN SATURATION: 98 % | HEART RATE: 65 BPM | WEIGHT: 173.2 LBS | SYSTOLIC BLOOD PRESSURE: 120 MMHG | BODY MASS INDEX: 25.65 KG/M2

## 2024-04-29 DIAGNOSIS — J45.20 MILD INTERMITTENT ASTHMA WITHOUT COMPLICATION: ICD-10-CM

## 2024-04-29 DIAGNOSIS — F31.9 BIPOLAR AFFECTIVE DISORDER, REMISSION STATUS UNSPECIFIED (HCC): ICD-10-CM

## 2024-04-29 DIAGNOSIS — Z20.2 STD EXPOSURE: ICD-10-CM

## 2024-04-29 DIAGNOSIS — Z20.2 STD EXPOSURE: Primary | ICD-10-CM

## 2024-04-29 LAB
HIV 1+2 AB+HIV1 P24 AG SERPL QL IA: NONREACTIVE
T PALLIDUM AB SER QL IA: NONREACTIVE

## 2024-04-29 PROCEDURE — 87491 CHLMYD TRACH DNA AMP PROBE: CPT

## 2024-04-29 PROCEDURE — 87389 HIV-1 AG W/HIV-1&-2 AB AG IA: CPT

## 2024-04-29 PROCEDURE — G8427 DOCREV CUR MEDS BY ELIG CLIN: HCPCS | Performed by: NURSE PRACTITIONER

## 2024-04-29 PROCEDURE — G8419 CALC BMI OUT NRM PARAM NOF/U: HCPCS | Performed by: NURSE PRACTITIONER

## 2024-04-29 PROCEDURE — 99214 OFFICE O/P EST MOD 30 MIN: CPT | Performed by: NURSE PRACTITIONER

## 2024-04-29 PROCEDURE — 1036F TOBACCO NON-USER: CPT | Performed by: NURSE PRACTITIONER

## 2024-04-29 PROCEDURE — 87661 TRICHOMONAS VAGINALIS AMPLIF: CPT

## 2024-04-29 PROCEDURE — 36415 COLL VENOUS BLD VENIPUNCTURE: CPT

## 2024-04-29 PROCEDURE — 86780 TREPONEMA PALLIDUM: CPT

## 2024-04-29 PROCEDURE — 87591 N.GONORRHOEAE DNA AMP PROB: CPT

## 2024-04-29 ASSESSMENT — ENCOUNTER SYMPTOMS
VOMITING: 0
CHEST TIGHTNESS: 0
COUGH: 0
DIARRHEA: 0
BACK PAIN: 0
COLOR CHANGE: 0
SORE THROAT: 0
SINUS PAIN: 0
NAUSEA: 0
PHOTOPHOBIA: 0
SINUS PRESSURE: 0
SHORTNESS OF BREATH: 0
ABDOMINAL PAIN: 0

## 2024-04-29 ASSESSMENT — PATIENT HEALTH QUESTIONNAIRE - PHQ9
8. MOVING OR SPEAKING SO SLOWLY THAT OTHER PEOPLE COULD HAVE NOTICED. OR THE OPPOSITE, BEING SO FIGETY OR RESTLESS THAT YOU HAVE BEEN MOVING AROUND A LOT MORE THAN USUAL: NOT AT ALL
2. FEELING DOWN, DEPRESSED OR HOPELESS: SEVERAL DAYS
5. POOR APPETITE OR OVEREATING: SEVERAL DAYS
4. FEELING TIRED OR HAVING LITTLE ENERGY: SEVERAL DAYS
SUM OF ALL RESPONSES TO PHQ QUESTIONS 1-9: 4
10. IF YOU CHECKED OFF ANY PROBLEMS, HOW DIFFICULT HAVE THESE PROBLEMS MADE IT FOR YOU TO DO YOUR WORK, TAKE CARE OF THINGS AT HOME, OR GET ALONG WITH OTHER PEOPLE: SOMEWHAT DIFFICULT
3. TROUBLE FALLING OR STAYING ASLEEP: SEVERAL DAYS
SUM OF ALL RESPONSES TO PHQ QUESTIONS 1-9: 4
SUM OF ALL RESPONSES TO PHQ9 QUESTIONS 1 & 2: 1
6. FEELING BAD ABOUT YOURSELF - OR THAT YOU ARE A FAILURE OR HAVE LET YOURSELF OR YOUR FAMILY DOWN: NOT AT ALL
1. LITTLE INTEREST OR PLEASURE IN DOING THINGS: NOT AT ALL
9. THOUGHTS THAT YOU WOULD BE BETTER OFF DEAD, OR OF HURTING YOURSELF: NOT AT ALL
SUM OF ALL RESPONSES TO PHQ QUESTIONS 1-9: 4
SUM OF ALL RESPONSES TO PHQ QUESTIONS 1-9: 4
7. TROUBLE CONCENTRATING ON THINGS, SUCH AS READING THE NEWSPAPER OR WATCHING TELEVISION: NOT AT ALL
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
## 2024-04-30 LAB
CHLAMYDIA DNA UR QL NAA+PROBE: NEGATIVE
N GONORRHOEA DNA UR QL NAA+PROBE: NEGATIVE
SOURCE: NORMAL
SPECIMEN DESCRIPTION: NORMAL
TRICHOMONAS VAGINALI, MOLECULAR: NEGATIVE

## 2024-05-29 ENCOUNTER — APPOINTMENT (OUTPATIENT)
Dept: GENERAL RADIOLOGY | Age: 29
End: 2024-05-29
Payer: MEDICAID

## 2024-05-29 ENCOUNTER — HOSPITAL ENCOUNTER (EMERGENCY)
Age: 29
Discharge: HOME | End: 2024-05-29
Attending: EMERGENCY MEDICINE
Payer: MEDICAID

## 2024-05-29 VITALS
TEMPERATURE: 97.5 F | SYSTOLIC BLOOD PRESSURE: 133 MMHG | HEART RATE: 66 BPM | DIASTOLIC BLOOD PRESSURE: 83 MMHG | OXYGEN SATURATION: 98 % | RESPIRATION RATE: 16 BRPM

## 2024-05-29 DIAGNOSIS — S90.31XA CONTUSION OF RIGHT FOOT, INITIAL ENCOUNTER: Primary | ICD-10-CM

## 2024-05-29 PROCEDURE — 73630 X-RAY EXAM OF FOOT: CPT

## 2024-05-29 PROCEDURE — 99283 EMERGENCY DEPT VISIT LOW MDM: CPT

## 2024-05-29 RX ORDER — ACETAMINOPHEN 500 MG
1000 TABLET ORAL ONCE
Status: DISCONTINUED | OUTPATIENT
Start: 2024-05-29 | End: 2024-05-29 | Stop reason: HOSPADM

## 2024-05-29 ASSESSMENT — ENCOUNTER SYMPTOMS
SORE THROAT: 0
DIARRHEA: 0
ABDOMINAL PAIN: 0
VOMITING: 0
SHORTNESS OF BREATH: 0
COUGH: 0
BACK PAIN: 0
WHEEZING: 0
NAUSEA: 0

## 2024-05-29 NOTE — DISCHARGE INSTRUCTIONS
Using Emergency Department for pain.  X-ray shows no fractures.  Will place an Ace wrap and give crutches for ambulation.  Please follow-up primary care provider in the next 1 to 2 weeks for reevaluation of necessary.  Please return for any new or worsening symptoms.
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
## 2024-08-20 ENCOUNTER — OFFICE VISIT (OUTPATIENT)
Dept: NEUROLOGY | Age: 29
End: 2024-08-20
Payer: MEDICAID

## 2024-08-20 VITALS
WEIGHT: 161 LBS | DIASTOLIC BLOOD PRESSURE: 68 MMHG | BODY MASS INDEX: 23.85 KG/M2 | SYSTOLIC BLOOD PRESSURE: 125 MMHG | HEART RATE: 53 BPM | HEIGHT: 69 IN

## 2024-08-20 DIAGNOSIS — R29.898 SHOULDER WEAKNESS: ICD-10-CM

## 2024-08-20 DIAGNOSIS — M25.511 CHRONIC RIGHT SHOULDER PAIN: ICD-10-CM

## 2024-08-20 DIAGNOSIS — M54.12 CERVICAL RADICULOPATHY: Primary | ICD-10-CM

## 2024-08-20 DIAGNOSIS — G56.01 CARPAL TUNNEL SYNDROME OF RIGHT WRIST: ICD-10-CM

## 2024-08-20 DIAGNOSIS — G89.29 CHRONIC RIGHT SHOULDER PAIN: ICD-10-CM

## 2024-08-20 PROCEDURE — G8427 DOCREV CUR MEDS BY ELIG CLIN: HCPCS | Performed by: PSYCHIATRY & NEUROLOGY

## 2024-08-20 PROCEDURE — 99214 OFFICE O/P EST MOD 30 MIN: CPT | Performed by: PSYCHIATRY & NEUROLOGY

## 2024-08-20 PROCEDURE — G8420 CALC BMI NORM PARAMETERS: HCPCS | Performed by: PSYCHIATRY & NEUROLOGY

## 2024-08-20 PROCEDURE — 1036F TOBACCO NON-USER: CPT | Performed by: PSYCHIATRY & NEUROLOGY

## 2024-08-20 NOTE — PROGRESS NOTES
[facility] [place]  [address], [address]  [place] [address]  Ph: [phone] or [phone]  FAX: [phone]    Chief Complaint: right shoulder pain and weakness     Yary Chinchilla is a 28 y.o. male.  Is a R handed male with bipolar 1 , ADHD is here for right shoulder pain    2/6/2024: He has not been doing the physical therapy and has not been taking the gabapentin or Flexeril as much as he feels sedated.  He does not like taking medications.  He is working on getting his disability.      4/27/2023  His arm was twisted by the police in 2022 and he ended in a hospital, since then he has been having problems with functionality of his right arm. He cannot move his right arm completely up above the shoulders associated with shooting pain from the shoulder joint and axilla which goes up to the hand and sometimes the pain goes from the finger tips to the elbow. 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     He takes weed to take the pain off and takes alcohol socially     Reviewed prior CT cervical images and no issues      6/27/2023   He had another episode when the police twisted his R shoulder , throwing him on the porch on 5/15/2023. He was not given any medical assistance as per the patients report. He took gabapentin for only 7 days.  He has not followed up with physical therapy mentioning that the physical therapist wanted to first look at the MRI prior to further therapy.  He said he will reach out to physical therapy again as he has not heard from them.    MRI OF THE RIGHT BRACHIAL PLEXUS WITHOUT AND WITH CONTRAST  5/16/2023 was normal.     10/3/2023:  He has been doing physical therapy and will lift up his right arm after the PT for almost around 3 days due to pain.  Feels

## 2024-09-09 DIAGNOSIS — R29.898 OTHER SYMPTOMS AND SIGNS INVOLVING THE MUSCULOSKELETAL SYSTEM: ICD-10-CM

## 2024-09-10 RX ORDER — GABAPENTIN 300 MG/1
CAPSULE ORAL
Qty: 90 CAPSULE | Refills: 5 | Status: SHIPPED | OUTPATIENT
Start: 2024-09-10 | End: 2024-12-09

## 2024-12-10 DIAGNOSIS — G89.29 CHRONIC RIGHT SHOULDER PAIN: ICD-10-CM

## 2024-12-10 DIAGNOSIS — M25.511 CHRONIC RIGHT SHOULDER PAIN: ICD-10-CM

## 2024-12-10 NOTE — TELEPHONE ENCOUNTER
Derrion called in requesting a refill on his Flexeril. He said he only uses it sporadically but he does take it when he needs to. He said if he takes it he doesn't leave the house but he needs it on occasion. He uses [facility] on [address]    He also asked me to check on the gabapentin RX.  I told him that Dr. Connell filled that on 9/10/24 with 5 refills. He said the pharmacy told him that he had to call us.  What I see is that that RX did have an end date of 12/9/24 which would not have been the full 6 months.   I called [facility] and lm asking them to return our call.

## 2024-12-11 RX ORDER — CYCLOBENZAPRINE HCL 10 MG
10 TABLET ORAL 3 TIMES DAILY PRN
Qty: 30 TABLET | Refills: 1 | Status: SHIPPED | OUTPATIENT
Start: 2024-12-11

## 2024-12-11 NOTE — TELEPHONE ENCOUNTER
I called [facility] again today about Gabapentin RX. I had to leave a message.  Please advise if okay to refill the Flexeril.

## 2025-02-06 ENCOUNTER — HOSPITAL ENCOUNTER (INPATIENT)
Age: 30
LOS: 2 days | Discharge: HOME | DRG: 603 | End: 2025-02-09
Attending: EMERGENCY MEDICINE | Admitting: INTERNAL MEDICINE
Payer: COMMERCIAL

## 2025-02-06 ENCOUNTER — APPOINTMENT (OUTPATIENT)
Dept: GENERAL RADIOLOGY | Age: 30
DRG: 603 | End: 2025-02-06
Payer: COMMERCIAL

## 2025-02-06 ENCOUNTER — HOSPITAL ENCOUNTER (EMERGENCY)
Age: 30
Discharge: ANOTHER HOSPITAL | DRG: 603 | End: 2025-02-06
Attending: EMERGENCY MEDICINE
Payer: COMMERCIAL

## 2025-02-06 VITALS
WEIGHT: 173 LBS | HEIGHT: 69 IN | BODY MASS INDEX: 25.62 KG/M2 | HEART RATE: 65 BPM | RESPIRATION RATE: 16 BRPM | DIASTOLIC BLOOD PRESSURE: 88 MMHG | TEMPERATURE: 98.1 F | OXYGEN SATURATION: 96 % | SYSTOLIC BLOOD PRESSURE: 135 MMHG

## 2025-02-06 DIAGNOSIS — S60.511A INFECTED ABRASION OF RIGHT HAND, INITIAL ENCOUNTER: Primary | ICD-10-CM

## 2025-02-06 DIAGNOSIS — M65.90 SYNOVITIS AND TENOSYNOVITIS: ICD-10-CM

## 2025-02-06 DIAGNOSIS — R51.9 NONINTRACTABLE HEADACHE, UNSPECIFIED CHRONICITY PATTERN, UNSPECIFIED HEADACHE TYPE: Primary | ICD-10-CM

## 2025-02-06 DIAGNOSIS — L08.9 INFECTED ABRASION OF RIGHT HAND, INITIAL ENCOUNTER: Primary | ICD-10-CM

## 2025-02-06 DIAGNOSIS — L02.511 ABSCESS OF FINGER OF RIGHT HAND: ICD-10-CM

## 2025-02-06 LAB
ANION GAP SERPL CALCULATED.3IONS-SCNC: 13 MMOL/L (ref 9–16)
BASOPHILS # BLD: 0.07 K/UL (ref 0–0.2)
BASOPHILS NFR BLD: 1 % (ref 0–2)
BUN SERPL-MCNC: 10 MG/DL (ref 6–20)
CALCIUM SERPL-MCNC: 9.4 MG/DL (ref 8.6–10.4)
CHLORIDE SERPL-SCNC: 101 MMOL/L (ref 98–107)
CO2 SERPL-SCNC: 22 MMOL/L (ref 20–31)
CREAT SERPL-MCNC: 0.9 MG/DL (ref 0.7–1.2)
CRP SERPL HS-MCNC: 8.3 MG/L (ref 0–5)
EOSINOPHIL # BLD: 0.13 K/UL (ref 0–0.44)
EOSINOPHILS RELATIVE PERCENT: 2 % (ref 1–4)
ERYTHROCYTE [DISTWIDTH] IN BLOOD BY AUTOMATED COUNT: 13.3 % (ref 11.8–14.4)
ERYTHROCYTE [SEDIMENTATION RATE] IN BLOOD BY PHOTOMETRIC METHOD: 37 MM/HR (ref 0–15)
GFR, ESTIMATED: >90 ML/MIN/1.73M2
GLUCOSE SERPL-MCNC: 101 MG/DL (ref 74–99)
HCT VFR BLD AUTO: 44.2 % (ref 40.7–50.3)
HGB BLD-MCNC: 14.5 G/DL (ref 13–17)
IMM GRANULOCYTES # BLD AUTO: 0 K/UL (ref 0–0.3)
IMM GRANULOCYTES NFR BLD: 0 %
LYMPHOCYTES NFR BLD: 2.98 K/UL (ref 1.1–3.7)
LYMPHOCYTES RELATIVE PERCENT: 46 % (ref 24–43)
MCH RBC QN AUTO: 22.7 PG (ref 25.2–33.5)
MCHC RBC AUTO-ENTMCNC: 32.8 G/DL (ref 28.4–34.8)
MCV RBC AUTO: 69.2 FL (ref 82.6–102.9)
MONOCYTES NFR BLD: 0.72 K/UL (ref 0.1–1.2)
MONOCYTES NFR BLD: 11 % (ref 3–12)
MORPHOLOGY: ABNORMAL
NEUTROPHILS NFR BLD: 40 % (ref 36–65)
NEUTS SEG NFR BLD: 2.6 K/UL (ref 1.5–8.1)
NRBC BLD-RTO: 0 PER 100 WBC
PLATELET # BLD AUTO: 283 K/UL (ref 138–453)
PMV BLD AUTO: 10.3 FL (ref 8.1–13.5)
POTASSIUM SERPL-SCNC: 4 MMOL/L (ref 3.7–5.3)
RBC # BLD AUTO: 6.39 M/UL (ref 4.21–5.77)
SODIUM SERPL-SCNC: 136 MMOL/L (ref 136–145)
WBC OTHER # BLD: 6.5 K/UL (ref 3.5–11.3)

## 2025-02-06 PROCEDURE — 2500000003 HC RX 250 WO HCPCS: Performed by: EMERGENCY MEDICINE

## 2025-02-06 PROCEDURE — 85652 RBC SED RATE AUTOMATED: CPT

## 2025-02-06 PROCEDURE — 6370000000 HC RX 637 (ALT 250 FOR IP)

## 2025-02-06 PROCEDURE — 86140 C-REACTIVE PROTEIN: CPT

## 2025-02-06 PROCEDURE — 80048 BASIC METABOLIC PNL TOTAL CA: CPT

## 2025-02-06 PROCEDURE — 73130 X-RAY EXAM OF HAND: CPT

## 2025-02-06 PROCEDURE — 6360000002 HC RX W HCPCS: Performed by: EMERGENCY MEDICINE

## 2025-02-06 PROCEDURE — 96374 THER/PROPH/DIAG INJ IV PUSH: CPT

## 2025-02-06 PROCEDURE — 2580000003 HC RX 258: Performed by: EMERGENCY MEDICINE

## 2025-02-06 PROCEDURE — 85025 COMPLETE CBC W/AUTO DIFF WBC: CPT

## 2025-02-06 PROCEDURE — 99285 EMERGENCY DEPT VISIT HI MDM: CPT

## 2025-02-06 PROCEDURE — 96361 HYDRATE IV INFUSION ADD-ON: CPT

## 2025-02-06 PROCEDURE — 96375 TX/PRO/DX INJ NEW DRUG ADDON: CPT

## 2025-02-06 RX ORDER — LIDOCAINE HYDROCHLORIDE 10 MG/ML
20 INJECTION, SOLUTION INFILTRATION; PERINEURAL ONCE
Status: COMPLETED | OUTPATIENT
Start: 2025-02-06 | End: 2025-02-07

## 2025-02-06 RX ORDER — DIPHENHYDRAMINE HYDROCHLORIDE 50 MG/ML
25 INJECTION INTRAMUSCULAR; INTRAVENOUS ONCE
Status: COMPLETED | OUTPATIENT
Start: 2025-02-06 | End: 2025-02-06

## 2025-02-06 RX ORDER — DEXAMETHASONE SODIUM PHOSPHATE 10 MG/ML
10 INJECTION, SOLUTION INTRAMUSCULAR; INTRAVENOUS ONCE
Status: COMPLETED | OUTPATIENT
Start: 2025-02-06 | End: 2025-02-06

## 2025-02-06 RX ORDER — OXYCODONE AND ACETAMINOPHEN 5; 325 MG/1; MG/1
1 TABLET ORAL ONCE
Status: COMPLETED | OUTPATIENT
Start: 2025-02-06 | End: 2025-02-06

## 2025-02-06 RX ORDER — 0.9 % SODIUM CHLORIDE 0.9 %
1000 INTRAVENOUS SOLUTION INTRAVENOUS ONCE
Status: COMPLETED | OUTPATIENT
Start: 2025-02-06 | End: 2025-02-06

## 2025-02-06 RX ORDER — VANCOMYCIN 2 G/400ML
2000 INJECTION, SOLUTION INTRAVENOUS ONCE
Status: DISCONTINUED | OUTPATIENT
Start: 2025-02-06 | End: 2025-02-06 | Stop reason: HOSPADM

## 2025-02-06 RX ADMIN — DIPHENHYDRAMINE HYDROCHLORIDE 25 MG: 50 INJECTION INTRAMUSCULAR; INTRAVENOUS at 20:11

## 2025-02-06 RX ADMIN — OXYCODONE HYDROCHLORIDE AND ACETAMINOPHEN 1 TABLET: 5; 325 TABLET ORAL at 23:41

## 2025-02-06 RX ADMIN — WATER 1000 MG: 1 INJECTION INTRAMUSCULAR; INTRAVENOUS; SUBCUTANEOUS at 20:59

## 2025-02-06 RX ADMIN — DEXAMETHASONE SODIUM PHOSPHATE 10 MG: 10 INJECTION, SOLUTION INTRAMUSCULAR; INTRAVENOUS at 19:22

## 2025-02-06 RX ADMIN — SODIUM CHLORIDE 1000 ML: 9 INJECTION, SOLUTION INTRAVENOUS at 19:22

## 2025-02-06 ASSESSMENT — PAIN - FUNCTIONAL ASSESSMENT
PAIN_FUNCTIONAL_ASSESSMENT: 0-10
PAIN_FUNCTIONAL_ASSESSMENT: 0-10

## 2025-02-06 ASSESSMENT — PAIN DESCRIPTION - LOCATION: LOCATION: HEAD

## 2025-02-06 ASSESSMENT — LIFESTYLE VARIABLES
HOW MANY STANDARD DRINKS CONTAINING ALCOHOL DO YOU HAVE ON A TYPICAL DAY: PATIENT DOES NOT DRINK
HOW OFTEN DO YOU HAVE A DRINK CONTAINING ALCOHOL: 2-4 TIMES A MONTH
HOW MANY STANDARD DRINKS CONTAINING ALCOHOL DO YOU HAVE ON A TYPICAL DAY: 1 OR 2
HOW OFTEN DO YOU HAVE A DRINK CONTAINING ALCOHOL: NEVER

## 2025-02-06 ASSESSMENT — PAIN SCALES - GENERAL
PAINLEVEL_OUTOF10: 7
PAINLEVEL_OUTOF10: 8

## 2025-02-06 ASSESSMENT — PAIN DESCRIPTION - FREQUENCY: FREQUENCY: CONTINUOUS

## 2025-02-06 ASSESSMENT — PAIN DESCRIPTION - DESCRIPTORS: DESCRIPTORS: SHARP;THROBBING

## 2025-02-07 PROBLEM — L08.9 INFECTED ABRASION OF RIGHT HAND: Status: ACTIVE | Noted: 2025-02-07

## 2025-02-07 PROBLEM — S60.511A INFECTED ABRASION OF RIGHT HAND: Status: ACTIVE | Noted: 2025-02-07

## 2025-02-07 PROBLEM — L02.511 ABSCESS OF FINGER, RIGHT: Status: ACTIVE | Noted: 2025-02-07

## 2025-02-07 PROBLEM — F31.9 BIPOLAR 1 DISORDER (HCC): Status: ACTIVE | Noted: 2025-02-07

## 2025-02-07 PROBLEM — L02.511 ABSCESS OF FINGER OF RIGHT HAND: Status: ACTIVE | Noted: 2025-02-07

## 2025-02-07 PROBLEM — F90.9 ATTENTION DEFICIT HYPERACTIVITY DISORDER (ADHD): Status: ACTIVE | Noted: 2025-02-07

## 2025-02-07 LAB
ANION GAP SERPL CALCULATED.3IONS-SCNC: 14 MMOL/L (ref 9–16)
BASOPHILS # BLD: <0.03 K/UL (ref 0–0.2)
BASOPHILS NFR BLD: 1 % (ref 0–2)
BUN SERPL-MCNC: 12 MG/DL (ref 6–20)
CALCIUM SERPL-MCNC: 9.2 MG/DL (ref 8.6–10.4)
CHLORIDE SERPL-SCNC: 104 MMOL/L (ref 98–107)
CO2 SERPL-SCNC: 18 MMOL/L (ref 20–31)
CREAT SERPL-MCNC: 0.9 MG/DL (ref 0.7–1.2)
EOSINOPHIL # BLD: <0.03 K/UL (ref 0–0.44)
EOSINOPHILS RELATIVE PERCENT: 0 % (ref 1–4)
ERYTHROCYTE [DISTWIDTH] IN BLOOD BY AUTOMATED COUNT: 13.4 % (ref 11.8–14.4)
EST. AVERAGE GLUCOSE BLD GHB EST-MCNC: 117 MG/DL
GFR, ESTIMATED: >90 ML/MIN/1.73M2
GLUCOSE SERPL-MCNC: 145 MG/DL (ref 74–99)
HBA1C MFR BLD: 5.7 % (ref 4–6)
HCT VFR BLD AUTO: 42.2 % (ref 40.7–50.3)
HGB BLD-MCNC: 13 G/DL (ref 13–17)
IMM GRANULOCYTES # BLD AUTO: <0.03 K/UL (ref 0–0.3)
IMM GRANULOCYTES NFR BLD: 0 %
LYMPHOCYTES NFR BLD: 1.13 K/UL (ref 1.1–3.7)
LYMPHOCYTES RELATIVE PERCENT: 26 % (ref 24–43)
MAGNESIUM SERPL-MCNC: 2.1 MG/DL (ref 1.6–2.6)
MCH RBC QN AUTO: 21.7 PG (ref 25.2–33.5)
MCHC RBC AUTO-ENTMCNC: 30.8 G/DL (ref 28.4–34.8)
MCV RBC AUTO: 70.3 FL (ref 82.6–102.9)
MONOCYTES NFR BLD: 0.14 K/UL (ref 0.1–1.2)
MONOCYTES NFR BLD: 3 % (ref 3–12)
MRSA, DNA, NASAL: NEGATIVE
NEUTROPHILS NFR BLD: 70 % (ref 36–65)
NEUTS SEG NFR BLD: 2.99 K/UL (ref 1.5–8.1)
NRBC BLD-RTO: 0 PER 100 WBC
PLATELET # BLD AUTO: 292 K/UL (ref 138–453)
PMV BLD AUTO: 11.7 FL (ref 8.1–13.5)
POTASSIUM SERPL-SCNC: 4.5 MMOL/L (ref 3.7–5.3)
RBC # BLD AUTO: 6 M/UL (ref 4.21–5.77)
RBC # BLD: ABNORMAL 10*6/UL
SODIUM SERPL-SCNC: 136 MMOL/L (ref 136–145)
SPECIMEN DESCRIPTION: NORMAL
URATE SERPL-MCNC: 4.6 MG/DL (ref 3.4–7)
WBC OTHER # BLD: 4.3 K/UL (ref 3.5–11.3)

## 2025-02-07 PROCEDURE — 85025 COMPLETE CBC W/AUTO DIFF WBC: CPT

## 2025-02-07 PROCEDURE — 87205 SMEAR GRAM STAIN: CPT

## 2025-02-07 PROCEDURE — 2580000003 HC RX 258

## 2025-02-07 PROCEDURE — 1200000000 HC SEMI PRIVATE

## 2025-02-07 PROCEDURE — 6370000000 HC RX 637 (ALT 250 FOR IP): Performed by: INTERNAL MEDICINE

## 2025-02-07 PROCEDURE — 2500000003 HC RX 250 WO HCPCS: Performed by: ORTHOPAEDIC SURGERY

## 2025-02-07 PROCEDURE — 6360000002 HC RX W HCPCS

## 2025-02-07 PROCEDURE — 87077 CULTURE AEROBIC IDENTIFY: CPT

## 2025-02-07 PROCEDURE — 83036 HEMOGLOBIN GLYCOSYLATED A1C: CPT

## 2025-02-07 PROCEDURE — 6370000000 HC RX 637 (ALT 250 FOR IP): Performed by: NURSE PRACTITIONER

## 2025-02-07 PROCEDURE — 80048 BASIC METABOLIC PNL TOTAL CA: CPT

## 2025-02-07 PROCEDURE — 2500000003 HC RX 250 WO HCPCS: Performed by: NURSE PRACTITIONER

## 2025-02-07 PROCEDURE — 84550 ASSAY OF BLOOD/URIC ACID: CPT

## 2025-02-07 PROCEDURE — 6360000002 HC RX W HCPCS: Performed by: STUDENT IN AN ORGANIZED HEALTH CARE EDUCATION/TRAINING PROGRAM

## 2025-02-07 PROCEDURE — 87070 CULTURE OTHR SPECIMN AEROBIC: CPT

## 2025-02-07 PROCEDURE — 2580000003 HC RX 258: Performed by: NURSE PRACTITIONER

## 2025-02-07 PROCEDURE — 36415 COLL VENOUS BLD VENIPUNCTURE: CPT

## 2025-02-07 PROCEDURE — 87075 CULTR BACTERIA EXCEPT BLOOD: CPT

## 2025-02-07 PROCEDURE — 99222 1ST HOSP IP/OBS MODERATE 55: CPT | Performed by: INTERNAL MEDICINE

## 2025-02-07 PROCEDURE — 86403 PARTICLE AGGLUT ANTBDY SCRN: CPT

## 2025-02-07 PROCEDURE — 83735 ASSAY OF MAGNESIUM: CPT

## 2025-02-07 PROCEDURE — 87641 MR-STAPH DNA AMP PROBE: CPT

## 2025-02-07 RX ORDER — CYCLOBENZAPRINE HCL 10 MG
10 TABLET ORAL 3 TIMES DAILY PRN
Status: DISCONTINUED | OUTPATIENT
Start: 2025-02-07 | End: 2025-02-09 | Stop reason: HOSPADM

## 2025-02-07 RX ORDER — SODIUM CHLORIDE 0.9 % (FLUSH) 0.9 %
5-40 SYRINGE (ML) INJECTION EVERY 12 HOURS SCHEDULED
Status: DISCONTINUED | OUTPATIENT
Start: 2025-02-07 | End: 2025-02-09 | Stop reason: HOSPADM

## 2025-02-07 RX ORDER — MAGNESIUM HYDROXIDE 1200 MG/15ML
1000 LIQUID ORAL EVERY 6 HOURS SCHEDULED
Status: DISCONTINUED | OUTPATIENT
Start: 2025-02-07 | End: 2025-02-09 | Stop reason: HOSPADM

## 2025-02-07 RX ORDER — LINEZOLID 600 MG/1
600 TABLET, FILM COATED ORAL EVERY 12 HOURS SCHEDULED
Status: DISCONTINUED | OUTPATIENT
Start: 2025-02-07 | End: 2025-02-07

## 2025-02-07 RX ORDER — LINEZOLID 600 MG/1
600 TABLET, FILM COATED ORAL EVERY 12 HOURS SCHEDULED
Status: DISCONTINUED | OUTPATIENT
Start: 2025-02-07 | End: 2025-02-09 | Stop reason: HOSPADM

## 2025-02-07 RX ORDER — ONDANSETRON 4 MG/1
4 TABLET, ORALLY DISINTEGRATING ORAL EVERY 8 HOURS PRN
Status: DISCONTINUED | OUTPATIENT
Start: 2025-02-07 | End: 2025-02-09 | Stop reason: HOSPADM

## 2025-02-07 RX ORDER — OXYCODONE AND ACETAMINOPHEN 5; 325 MG/1; MG/1
2 TABLET ORAL EVERY 4 HOURS PRN
Status: DISCONTINUED | OUTPATIENT
Start: 2025-02-07 | End: 2025-02-08

## 2025-02-07 RX ORDER — OXYCODONE AND ACETAMINOPHEN 5; 325 MG/1; MG/1
1 TABLET ORAL EVERY 4 HOURS PRN
Status: DISCONTINUED | OUTPATIENT
Start: 2025-02-07 | End: 2025-02-08

## 2025-02-07 RX ORDER — POLYETHYLENE GLYCOL 3350 17 G/17G
17 POWDER, FOR SOLUTION ORAL DAILY PRN
Status: DISCONTINUED | OUTPATIENT
Start: 2025-02-07 | End: 2025-02-09 | Stop reason: HOSPADM

## 2025-02-07 RX ORDER — FLUTICASONE PROPIONATE 110 UG/1
2 AEROSOL, METERED RESPIRATORY (INHALATION)
Status: DISCONTINUED | OUTPATIENT
Start: 2025-02-07 | End: 2025-02-09 | Stop reason: HOSPADM

## 2025-02-07 RX ORDER — DIPHENHYDRAMINE HCL 25 MG
25 TABLET ORAL EVERY 6 HOURS PRN
Status: DISCONTINUED | OUTPATIENT
Start: 2025-02-07 | End: 2025-02-09 | Stop reason: HOSPADM

## 2025-02-07 RX ORDER — ACETAMINOPHEN 650 MG/1
650 SUPPOSITORY RECTAL EVERY 6 HOURS PRN
Status: DISCONTINUED | OUTPATIENT
Start: 2025-02-07 | End: 2025-02-09 | Stop reason: HOSPADM

## 2025-02-07 RX ORDER — ACETAMINOPHEN 325 MG/1
650 TABLET ORAL EVERY 6 HOURS PRN
Status: DISCONTINUED | OUTPATIENT
Start: 2025-02-07 | End: 2025-02-09 | Stop reason: HOSPADM

## 2025-02-07 RX ORDER — SODIUM CHLORIDE 9 MG/ML
INJECTION, SOLUTION INTRAVENOUS PRN
Status: DISCONTINUED | OUTPATIENT
Start: 2025-02-07 | End: 2025-02-09 | Stop reason: HOSPADM

## 2025-02-07 RX ORDER — SODIUM CHLORIDE 9 MG/ML
INJECTION, SOLUTION INTRAVENOUS CONTINUOUS
Status: DISCONTINUED | OUTPATIENT
Start: 2025-02-07 | End: 2025-02-08

## 2025-02-07 RX ORDER — ENOXAPARIN SODIUM 100 MG/ML
40 INJECTION SUBCUTANEOUS DAILY
Status: DISCONTINUED | OUTPATIENT
Start: 2025-02-07 | End: 2025-02-09 | Stop reason: HOSPADM

## 2025-02-07 RX ORDER — GABAPENTIN 300 MG/1
300 CAPSULE ORAL 3 TIMES DAILY
Status: DISCONTINUED | OUTPATIENT
Start: 2025-02-07 | End: 2025-02-09 | Stop reason: HOSPADM

## 2025-02-07 RX ORDER — ONDANSETRON 2 MG/ML
4 INJECTION INTRAMUSCULAR; INTRAVENOUS EVERY 6 HOURS PRN
Status: DISCONTINUED | OUTPATIENT
Start: 2025-02-07 | End: 2025-02-09 | Stop reason: HOSPADM

## 2025-02-07 RX ORDER — BUTALBITAL, ACETAMINOPHEN AND CAFFEINE 50; 325; 40 MG/1; MG/1; MG/1
1 TABLET ORAL EVERY 4 HOURS PRN
Status: DISCONTINUED | OUTPATIENT
Start: 2025-02-07 | End: 2025-02-09 | Stop reason: HOSPADM

## 2025-02-07 RX ORDER — MAGNESIUM SULFATE 1 G/100ML
1000 INJECTION INTRAVENOUS PRN
Status: DISCONTINUED | OUTPATIENT
Start: 2025-02-07 | End: 2025-02-09 | Stop reason: HOSPADM

## 2025-02-07 RX ORDER — POTASSIUM CHLORIDE 1500 MG/1
40 TABLET, EXTENDED RELEASE ORAL PRN
Status: DISCONTINUED | OUTPATIENT
Start: 2025-02-07 | End: 2025-02-09 | Stop reason: HOSPADM

## 2025-02-07 RX ORDER — SODIUM CHLORIDE 0.9 % (FLUSH) 0.9 %
10 SYRINGE (ML) INJECTION PRN
Status: DISCONTINUED | OUTPATIENT
Start: 2025-02-07 | End: 2025-02-09 | Stop reason: HOSPADM

## 2025-02-07 RX ORDER — FLUTICASONE PROPIONATE 50 MCG
2 SPRAY, SUSPENSION (ML) NASAL DAILY PRN
Status: DISCONTINUED | OUTPATIENT
Start: 2025-02-07 | End: 2025-02-09 | Stop reason: HOSPADM

## 2025-02-07 RX ORDER — POTASSIUM CHLORIDE 7.45 MG/ML
10 INJECTION INTRAVENOUS PRN
Status: DISCONTINUED | OUTPATIENT
Start: 2025-02-07 | End: 2025-02-09 | Stop reason: HOSPADM

## 2025-02-07 RX ORDER — ALBUTEROL SULFATE 90 UG/1
2 INHALANT RESPIRATORY (INHALATION) 4 TIMES DAILY PRN
Status: DISCONTINUED | OUTPATIENT
Start: 2025-02-07 | End: 2025-02-09 | Stop reason: HOSPADM

## 2025-02-07 RX ADMIN — LIDOCAINE HYDROCHLORIDE 20 ML: 10 INJECTION, SOLUTION INFILTRATION; PERINEURAL at 00:15

## 2025-02-07 RX ADMIN — SODIUM CHLORIDE: 9 INJECTION, SOLUTION INTRAVENOUS at 02:49

## 2025-02-07 RX ADMIN — LINEZOLID 600 MG: 600 TABLET, FILM COATED ORAL at 12:54

## 2025-02-07 RX ADMIN — SODIUM CHLORIDE 1000 ML: 900 IRRIGANT IRRIGATION at 18:00

## 2025-02-07 RX ADMIN — OXYCODONE HYDROCHLORIDE AND ACETAMINOPHEN 2 TABLET: 5; 325 TABLET ORAL at 03:01

## 2025-02-07 RX ADMIN — GABAPENTIN 300 MG: 300 CAPSULE ORAL at 08:03

## 2025-02-07 RX ADMIN — VANCOMYCIN HYDROCHLORIDE 1000 MG: 1 INJECTION, POWDER, LYOPHILIZED, FOR SOLUTION INTRAVENOUS at 08:15

## 2025-02-07 RX ADMIN — GABAPENTIN 300 MG: 300 CAPSULE ORAL at 22:08

## 2025-02-07 RX ADMIN — VANCOMYCIN HYDROCHLORIDE 2000 MG: 1 INJECTION, POWDER, LYOPHILIZED, FOR SOLUTION INTRAVENOUS at 01:15

## 2025-02-07 RX ADMIN — SODIUM CHLORIDE 1000 ML: 900 IRRIGANT IRRIGATION at 23:13

## 2025-02-07 RX ADMIN — LINEZOLID 600 MG: 600 TABLET, FILM COATED ORAL at 22:08

## 2025-02-07 RX ADMIN — BUTALBITAL, ACETAMINOPHEN, AND CAFFEINE 1 TABLET: 50; 325; 40 TABLET ORAL at 22:08

## 2025-02-07 RX ADMIN — CYCLOBENZAPRINE 10 MG: 10 TABLET, FILM COATED ORAL at 03:01

## 2025-02-07 RX ADMIN — DIPHENHYDRAMINE HYDROCHLORIDE 25 MG: 25 TABLET ORAL at 03:01

## 2025-02-07 RX ADMIN — SODIUM CHLORIDE, PRESERVATIVE FREE 10 ML: 5 INJECTION INTRAVENOUS at 22:10

## 2025-02-07 RX ADMIN — GABAPENTIN 300 MG: 300 CAPSULE ORAL at 14:31

## 2025-02-07 RX ADMIN — CYCLOBENZAPRINE 10 MG: 10 TABLET, FILM COATED ORAL at 14:32

## 2025-02-07 RX ADMIN — OXYCODONE HYDROCHLORIDE AND ACETAMINOPHEN 2 TABLET: 5; 325 TABLET ORAL at 22:08

## 2025-02-07 ASSESSMENT — PAIN DESCRIPTION - ORIENTATION
ORIENTATION: RIGHT
ORIENTATION: RIGHT

## 2025-02-07 ASSESSMENT — PAIN SCALES - GENERAL
PAINLEVEL_OUTOF10: 5
PAINLEVEL_OUTOF10: 7
PAINLEVEL_OUTOF10: 8
PAINLEVEL_OUTOF10: 10
PAINLEVEL_OUTOF10: 6

## 2025-02-07 ASSESSMENT — PAIN DESCRIPTION - PAIN TYPE: TYPE: ACUTE PAIN

## 2025-02-07 ASSESSMENT — PAIN DESCRIPTION - DESCRIPTORS
DESCRIPTORS: BURNING;TINGLING
DESCRIPTORS: BURNING;TINGLING
DESCRIPTORS: ACHING;THROBBING

## 2025-02-07 ASSESSMENT — PAIN DESCRIPTION - LOCATION
LOCATION: FINGER (COMMENT WHICH ONE);HAND
LOCATION: HAND

## 2025-02-07 ASSESSMENT — PAIN DESCRIPTION - FREQUENCY: FREQUENCY: CONTINUOUS

## 2025-02-07 ASSESSMENT — PAIN - FUNCTIONAL ASSESSMENT
PAIN_FUNCTIONAL_ASSESSMENT: 0-10
PAIN_FUNCTIONAL_ASSESSMENT: PREVENTS OR INTERFERES SOME ACTIVE ACTIVITIES AND ADLS

## 2025-02-07 ASSESSMENT — PAIN DESCRIPTION - ONSET: ONSET: ON-GOING

## 2025-02-07 NOTE — ED TRIAGE NOTES
Pt ambulated to room 02 from triage with c/o right hand pain x 2 days. Pt states the pain started just before work and denies any acute injuries. Pt reports hx of fractures to the right hand x 7 and an incident of a piece of metal penetrating wound 2 years ago. No other complaints at this time. Breathing is non-labored. Call light is within reach.

## 2025-02-07 NOTE — ED NOTES
ED to inpatient nurses report      Chief Complaint:  Chief Complaint   Patient presents with    Hand Pain     Right hand pain     Present to ED from: Home via [facility]    MOA:     LOC: alert and orientated to name, place, date  Mobility: Independent  Oxygen Baseline: Room air    Current needs required: None   Pending ED orders: None  Present condition: Stable    Why did the patient come to the ED? Pt ambulated to room 02 from triage with c/o right hand pain x 2 days. Pt states the pain started just before work and denies any acute injuries. Pt reports hx of fractures to the right hand x 7 and an incident of a piece of metal penetrating wound 2 years ago. No other complaints at this time. Breathing is non-labored.   What is the plan? Admission  Any procedures or intervention occur? I&D right hand  Any safety concerns?? None    Mental Status:  Level of Consciousness: Alert (0)    Psych Assessment:   Psychosocial  Psychosocial (WDL): Within Defined Limits  Vital signs   Vitals:    02/06/25 2215 02/06/25 2230 02/06/25 2300 02/06/25 2315   BP: 118/84 (!) 129/91 (!) 130/95 126/87   Pulse:       Resp:       Temp:       TempSrc:       SpO2: 100% 100% 99% 100%   Weight:       Height:            Vitals:  Patient Vitals for the past 24 hrs:   BP Temp Temp src Pulse Resp SpO2 Height Weight   02/06/25 2315 126/87 -- -- -- -- 100 % -- --   02/06/25 2300 (!) 130/95 -- -- -- -- 99 % -- --   02/06/25 2230 (!) 129/91 -- -- -- -- 100 % -- --   02/06/25 2215 118/84 -- -- -- -- 100 % -- --   02/06/25 2200 123/84 -- -- -- -- 100 % -- --   02/06/25 2145 125/78 97.9 °F (36.6 °C) Oral 65 20 100 % 1.753 m (5' 9\") 78.5 kg (173 lb)      Visit Vitals  /87   Pulse 65   Temp 97.9 °F (36.6 °C) (Oral)   Resp 20   Ht 1.753 m (5' 9\")   Wt 78.5 kg (173 lb)   SpO2 100%   BMI 25.55 kg/m²        LDAs:   Peripheral IV 02/06/25 Right;Anterior Forearm (Active)       Ambulatory Status:  Presents to emergency department  because of falls (Syncope,  seizure, or loss of consciousness): No, Age > 70: No, Altered Mental Status, Intoxication with alcohol or substance confusion (Disorientation, impaired judgment, poor safety awaremess, or inability to follow instructions): No, Impaired Mobility: Ambulates or transfers with assistive devices or assistance; Unable to ambulate or transer.: No, Nursing Judgement: No    Diagnosis:  DISPOSITION Decision To Admit 02/07/2025 12:05:01 AM   Final diagnoses:   Infected abrasion of right hand, initial encounter        Consults:  IP CONSULT TO PLASTIC SURGERY  IP CONSULT TO ORTHOPEDIC SURGERY  IP CONSULT TO INFECTIOUS DISEASES     Treatment Team:   Treatment Team:   Joseph Carter MD Bailey, Michael, Kevin Lin, DO Garcia, MD Huan Srinivasan Seth A, Erasto Cortez, DO Valentin, Sivakumar RIVAS, Caleb Sousa, Patricio Batista, Brendon Romero, DO Padua, Fortunato G, Chuck Humphrey, Vamshi Kenny, Taj Florentino,   AngelPorsha ragsdale, Edilberto Lopez, Chris Albert, DO Astorga, Karyn S, DO  Ron Adams, DO Hernandez, Jose BHAT,     Treatment:  ED Course as of 02/07/25 0021   Thu Feb 06, 2025 2141 Patient is a     Derrion Chinchilla [birth year]  29M Right hand swelling, 4th digit  Sed/CRP elevated  Bev wanted ER to ER  Ground vs Private auto    Physical Exam:      Concern for:     Plan/Impression:    [AS]   2255 Orthopedic surgery at bedside.  They do not think this is tenosynovitis.  They think it is a localized abscess on the dorsal aspect of the PIP.  They are going to a bedside I&D.  Patient will need admission with IV antibiotics and wound care.  Orthopedic surgery/plastics would like patient be admitted to medicine..  They will follow along [AS]   1612 Per Dr. Pablo and orthopedic surgery team, they would like infectious disease consulted and had the patient started on vancomycin [AS]      ED Course User Index  [AS] Kevin Bourgeois DO          Skin Assessment:

## 2025-02-07 NOTE — CONSULTS
Pharmacy dosing of initial vancomycin ordered by ED provider.    Wt Readings from Last 1 Encounters:   02/06/25 78.5 kg (173 lb)       2000 mg ordered x 1.    Pharmacy is waiting to see if vancomycin therapy is continued or stopped/changed by the admitting physician.

## 2025-02-07 NOTE — CONSULTS
ATTESTATION:    I have discussed the case, including pertinent history and exam findings with the APRN. I have evaluated the  History, physical findings and pictures of the patient and the key elements of the encounter have been performed by me. I have reviewed the laboratory data, other diagnostic studies and discussed them with the APRN. I have updated the medical record where necessary.    I agree with the assessment, plan and orders as documented by the APRN.    In addition diagnostic and decision making elements, performed by the Attending Physician, are included in the Diagnostic and Decision Making Section of the text.    Elements of Medical Decision Making:  Note: I have independently performed the steps listed below as part of the medical decision making and evaluation.   Examined and discussed with patient.  Abscess of Rt 4th finger   S/P I&D on 2-6-25  Bipolar disorder  ADHD  PTSD  Essential HTN  Asthma  Allergy to Betadine  Labs, medications, radiologic studies were reviewed with personal review of films  Radiologic studies  Lab work  Cultures  Wound 2-7-25 : pending  Large amounts of data were reviewed  Please see HPI  Discussed with nursing Staff, Discharge planner  Lavinia Mcbride  Infection Control and Prevention measures reviewed  Universal precaution  All prior entries were reviewed  IM and Plastics notes reviewed  Administer medications as ordered  Linezolid  Prognosis:   Guarded  Discharge planning reviewed  Follow up as outpatient.    Bethel Khan MD     2/7/2025      [facility] - Initial Consult Note  Today's Date and Time: 2/7/2025, 11:03 AM    Impression :   Right 4th digit dorsal abscess  S/P I&D on 2/6/25  Bipolar disorder  ADHD  PTSD  HTN  Asthma  Betadine allergy    Recommendations:   Continue PO Zyvox 600 mg BID x 10 days  Plastic Surgery recommendations  S/p I&D on 2/7/25    Medical Decision Making/Summary/Discussion:2/7/2025       Infection  stain [[id number]]     Order Status: Sent Specimen: No Site Given from Wound     Culture, Wound (with Gram Stain) [[id number]]     Order Status: Sent Specimen: No Site Given from Skin     Culture, Anaerobic and Aerobic [[id number]] Collected: 02/07/25 0048    Order Status: Completed Specimen: Hand Updated: 02/07/25 0758     Specimen Description .HAND     Direct Exam RARE NEUTROPHILS      NO ORGANISMS SEEN     Culture PENDING              Medical Decision Making-Other:     Note:      Thank you for allowing us to participate in the care of this patient. Please call with questions.    JENN Alberto - [facility]  Pager: [phone] - Office: [phone]

## 2025-02-07 NOTE — CONSULTS
Plastic Surgery Consult  (Dr. Garcia)      Time of Evaluation: 1015pm    CC/Reason for consult:  Right 4th finger infection    HPI:      The patient is a 29 y.o. right hand-dominant male who presents with right fourth digit pain and swelling.  Patient's history is inconsistent.  Approximately 2 to 3 days ago he states that the finger started to become swollen and that he could not move it at all.  Patient states in the past he has had multiple lacerations to the hand as well as some nerve injuries.  He states that even though he cannot use the right hand fully he is still right-hand-dominant.  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  Patient denies any fevers, chills, previous infection needing antibiotics or surgery.  Patient states past medical history includes multiple allergies to any NSAIDs, Betadine, shellfish, aspirin.  Patient was incarcerated in the past.  Patient does have bipolar, ADHD, PTSD, hypertension, asthma.  Patient smokes marijuana but denies any other substance use.    Mother is at bedside.  Both patient and mother are very argumentative, hostile to staff.  They are repeatedly stating that staff is lying to them about his care.  With nurse in room we did discuss treatment of the hand and that we recommend IV antibiotics as well as a bedside procedure.  We did discuss that if patient would like to decline receiving IV antibiotics and treatment, that is his right.  Patient states that staff is lying about the timing of his antibiotics.      Past Medical History:    Past Medical History:   Diagnosis Date    ADHD (attention deficit hyperactivity disorder)     Asthma     Bipolar 1 disorder, depressed (HCC)     Borderline diabetic     Eczema     Hypertension     PTSD (post-traumatic stress disorder)      Past Surgical History:    Past Surgical History:   Procedure Laterality Date    FINGER

## 2025-02-07 NOTE — ED PROVIDER NOTES
[facility] EMERGENCY DEPARTMENT     Emergency Department     Faculty Attestation        I performed a history and physical examination of the patient and discussed management with the resident. I reviewed the resident’s note and agree with the documented findings and plan of care. Any areas of disagreement are noted on the chart. I was personally present for the key portions of any procedures. I have documented in the chart those procedures where I was not present during the key portions. I have reviewed the emergency nurses triage note. I agree with the chief complaint, past medical history, past surgical history, allergies, medications, social and family history as documented unless otherwise noted below.    For mid-level providers such as nurse practitioners as well as physicians assistants:    I have personally seen and evaluated the patient.    I find the patient's history and physical exam are consistent with NP/PA documentation.  I agree with the care provided, treatment rendered, disposition, & follow-up plan.     Additional findings are as noted.    Vital Signs: /78   Pulse 65   Temp 97.9 °F (36.6 °C) (Oral)   Resp 20   Ht 1.753 m (5' 9\")   Wt 78.5 kg (173 lb)   SpO2 100%   BMI 25.55 kg/m²   PCP:  Jennifer May, APRN - CNP    Pertinent Comments:     Patient with focal pain at the right fourth PIP he has no pain or tenderness over the flexor tendon sheath.  Digit is not fusiform.      Critical Care  None          Demond Carter MD    Attending Emergency Medicine Physician            Joseph Carter MD  02/06/25 9754

## 2025-02-07 NOTE — ED NOTES
Pt now informs writer that he came here to be treated for his headache as well and would like to be evaluated and treated for such. Resident notified.

## 2025-02-07 NOTE — PROGRESS NOTES
Orthopedic Progress Note    Patient:  Yary Chinchilla  YOB: 1995     29 y.o. male    Subjective:  Patient seen and examined this morning. No complaints or concerns. No issues overnight per nursing. Pain is well controlled on current regimen. Denies fever, HA, CP, SOB, N/V, dysuria, new numbness/tingling.     Vitals reviewed, afebrile    Objective:   Vitals:    02/07/25 0301   BP:    Pulse:    Resp: 20   Temp:    SpO2:      Gen: NAD, uncooperative    Cardiovascular: Regular rate   Respiratory: No acute respiratory distress, breathing comfortably    Orthopedic Exam  RUE:    Dressings are clean, dry, and intact without strike-through.  Unwrapped for evaluation. Packing in place, sanguinous drainage seen. No expressible purulent fluid.  Patient is mildly diffusely tender about the digit but not significantly tender over the A1 pulley or along the flexor tendon.  Patient states he has no motor to the finger.  Passively able to range the digit but limited due to pain.  Patient's examination is inconsistent based on distractibility.  Compartments soft and easily compressible. Ulnar/median/AIN/PIN/radial motor intact. Axillary/MCN/median/ulnar/radial nerves SILT. Radial pulse 2+ with BCR, fingers are warm and well perfused.         Recent Labs     02/06/25  1923   WBC 6.5   HGB 14.5   HCT 44.2         K 4.0   BUN 10   CREATININE 0.9   GLUCOSE 101*      Meds:   Abx: Vanc  See rec for complete list    Impression 29 y.o. male who is being seen for:    -Right fourth digit dorsal abscess     Plan  -Discussed with Dr. Garcia, plan for warm saline sokas q6h. Attending will round on patient later today and update plan accordingly.  -Keep NPO until evaluation.  -Will continue to trend clinically currently.    -Recommend IV antibiotics.  Recommend ID consult.  -Patient is allergic to Betadine so we will not perform Betadine soaks.  - Soft dressings on RUE . Please maintain and keep clean and

## 2025-02-07 NOTE — H&P
[facility]  Office: [phone]  Noé You DO, Mane Hanley DO, Ignacio Ramos DO, Kail Renae DO, Yazan Coates MD, Alicja Hobson MD, Tee Larios MD, Allie Franklin MD,  Aaron Sun MD, Rick Mabry MD, Chantelle Pascal MD,  Radha Chaidez DO, Andriy Pablo MD, Chris Gonzalez MD, Tadeo You DO, Jeni Nguyen MD,  Michel Mcbride DO, Bri Lima MD, Jovanna Peck MD, Ainsley Hopkins MD, Bebe Jamison MD,  Ashutosh Denis MD, Davon Sánchez MD, Subhash Weller MD, Zenobia Mccormack MD, Adalberto Medley MD, Nj Monahan MD, Sami Guzman DO, Eber Oliver MD, Radha Hawk MD, Mohsin Reza, MD, Shirley Waterhouse, CNP,  Elle Pimentel CNP, Sami Humphries, CNP,  Araceli Wiseman, ZOE, Cherelle Hubbard, CNP, Joleen Saravia, CNP, Maureen Leary, CNP, Sia Pineda CNP, Livia Sánchez PA-C, Caitlin Brush, CNP, Liam Anna, CNP,  Khushboo Lion, [facility], Nica Mcadams, CNP, Clarisse Williamson, CNP,  Obdulia Galeano, CNS, Caridad Martinez CNP, Domonique Jeffrey [facility],   Silva Shrestha, [facility]   IN-PATIENT SERVICE   [facility]    HISTORY AND PHYSICAL EXAMINATION            Date:   2/7/2025  Patient name:  Yary Chinchilla  Date of admission:  2/6/2025  9:40 PM  MRN:   [id number]  Account:  [id number]  YOB: 1995  PCP:    Jennifer May APRN - CNP  Room:   02/02  Code Status:    Full Code    Chief Complaint:     Chief Complaint   Patient presents with    Hand Pain     Right hand pain   \"I got tired of being cussed out for not getting checked out\"    History Obtained From:     patient    History of Present Illness:     Yary Chinchilla is a 29 y.o.  male with history of C-spine radiculopathy, chronic right shoulder pain, bipolar, ADHD who presents with Hand Pain (Right hand pain)  and is admitted to the hospital for the management of Abscess of finger, right.    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  Check MRSA screen.   Acute headache trial of Fioricet.  Advised to avoid opiates with concern for rebound headaches.  Counseled on avoidance of using electronics, watching TV etc. with concern for triggering headaches  History of C-spine radiculopathy with chronic right shoulder pain with history of carpal tunnel right wrist.  Status post prior neurowork-up.  Continued on Neurontin/Flexeril.  OARRS report reviewed  History of bipolar, ADHD.  Not currently on medication.  Supportive therapy  Asthma/allergic rhinitis- resume as needed bronchodilators.  Resume Flonase    Prior records reviewed independent by myself.  OARRS report reviewed.  Home medications reconciled    Likely discharge in 2 to 3 days contingent on clinical progress    Question concerns and treatment plan discussed with patient and with mom at bedside    If further surgery is required patient is likely low risk for any perioperative cardiopulmonary complications    Consultations:   IP CONSULT TO PLASTIC SURGERY  IP CONSULT TO ORTHOPEDIC SURGERY  IP CONSULT TO INFECTIOUS DISEASES  PHARMACY TO DOSE VANCOMYCIN  IP CONSULT TO HOSPITALIST  PHARMACY TO DOSE VANCOMYCIN     Patient is admitted as inpatient status because of co-morbidities listed above, severity of signs and symptoms as outlined, requirement for current medical therapies and most importantly because of direct risk to patient if care not provided in a hospital setting.  Expected length of stay > 48 hours.    Jeni OJEDA

## 2025-02-07 NOTE — PLAN OF CARE
Problem: Discharge Planning  Goal: Discharge to home or other facility with appropriate resources  Outcome: Progressing  Flowsheets (Taken 2/7/2025 0248)  Discharge to home or other facility with appropriate resources:   Identify barriers to discharge with patient and caregiver   Arrange for needed discharge resources and transportation as appropriate   Identify discharge learning needs (meds, wound care, etc)   Arrange for interpreters to assist at discharge as needed   Refer to discharge planning if patient needs post-hospital services based on physician order or complex needs related to functional status, cognitive ability or social support system     Problem: Pain  Goal: Verbalizes/displays adequate comfort level or baseline comfort level  Outcome: Progressing     Problem: Neurosensory - Adult  Goal: Achieves stable or improved neurological status  Outcome: Progressing  Goal: Absence of seizures  Outcome: Progressing  Goal: Remains free of injury related to seizures activity  Outcome: Progressing  Goal: Achieves maximal functionality and self care  Outcome: Progressing     Problem: Skin/Tissue Integrity - Adult  Goal: Skin integrity remains intact  Outcome: Progressing  Goal: Incisions, wounds, or drain sites healing without S/S of infection  Outcome: Progressing  Goal: Oral mucous membranes remain intact  Outcome: Progressing     Problem: Musculoskeletal - Adult  Goal: Return mobility to safest level of function  Outcome: Progressing  Goal: Maintain proper alignment of affected body part  Outcome: Progressing  Goal: Return ADL status to a safe level of function  Outcome: Progressing     Problem: Infection - Adult  Goal: Absence of infection at discharge  Outcome: Progressing  Goal: Absence of infection during hospitalization  Outcome: Progressing  Goal: Absence of fever/infection during anticipated neutropenic period  Outcome: Progressing

## 2025-02-07 NOTE — ED PROVIDER NOTES
[facility]  Emergency Department Encounter  Emergency Medicine Resident     Pt Name:Yary Chinchilla  MRN: [id number]  Birthdate 1995  Date of evaluation: 2/6/25  PCP:  Jennifer May APRN - CNP  Note Started: 9:47 PM EST      CHIEF COMPLAINT       Chief Complaint   Patient presents with    Hand Pain     Right hand pain       HISTORY OF PRESENT ILLNESS  (Location/Symptom, Timing/Onset, Context/Setting, Quality, Duration, Modifying Factors, Severity.)      Yary Chinchilla is a 29-year-old male that presents to the ED as a transfer from outside facility for concerns of tenosynovitis of the right ring finger.  Patient states he had increased swelling and pain over the last 24 hours.  Patient went to outside facility and found to have elevated ESR and CRP.  Plastic surgery was consulted and Dr. Pablo recommended ER to ER transfer.  Patient was given Rocephin IV for initial coverage.  On arrival, patient is complaining of a mild headache as well as pain of his right ring finger.  Patient denies any traumatic injury.  He states he has a superficial abrasion on the distal PIP.  Patient states he is prediabetic, not on any medication.  Patient denies any numbness or tingling in the distal finger but reports decreased range of motion.  No reported prior injuries or trauma to the hand.     Patient reports a history of baseline anemia.      PAST MEDICAL / SURGICAL / SOCIAL / FAMILY HISTORY      has a past medical history of ADHD (attention deficit hyperactivity disorder), Asthma, Bipolar 1 disorder, depressed (HCC), Borderline diabetic, Eczema, Hypertension, and PTSD (post-traumatic stress disorder).       has a past surgical history that includes Finger surgery.      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  Right upper extremity: Right hand: Patient does have obvious swelling of the fourth finger PIP with swelling and TTP.  Swelling does not extend beyond the first joint.  Patient finger is held in mild flexion with pain elicited during extension of the finger.  Capillary refill 3 seconds.  Nailbeds pale in color     Concern for: Tenosynovitis, cellulitis, infection of the soft tissue, abscess    Plan/Impression:   Will consult orthopedic surgery/plastic surgery further evaluation.  Dispo pending reevaluation.  As patient was transferred here at Dr. Neal request, patient likely needs to be admitted for continued IV antibiotics and monitoring. [AS]   2255 Orthopedic surgery at bedside.  They do not think this is tenosynovitis.  They think it is a localized abscess on the dorsal aspect of the PIP.  They are going to a bedside I&D.  Patient will need admission with IV antibiotics and wound care.  Orthopedic surgery/plastics would like patient be admitted to medicine..  They will follow along [AS]   0039 Per Dr. Pablo and orthopedic surgery team, they would like infectious disease consulted and had the patient started on vancomycin [AS]   Fri Feb 07, 2025   0111 [facility] to admit [AS]      ED Course User Index  [AS] Kevin Bourgeois, DO       PROCEDURES:      CONSULTS:  IP CONSULT TO PLASTIC SURGERY  IP CONSULT TO ORTHOPEDIC SURGERY  IP CONSULT TO INFECTIOUS DISEASES  PHARMACY TO DOSE VANCOMYCIN  IP CONSULT TO HOSPITALIST  PHARMACY TO DOSE VANCOMYCIN    CRITICAL CARE:  There was significant risk of life threatening deterioration of patient's condition requiring my direct management. Critical care time  minutes, excluding any documented procedures.    FINAL IMPRESSION      1. Infected abrasion of right hand, initial encounter          DISPOSITION / PLAN     DISPOSITION Admitted 02/07/2025 01:12:13 AM               PATIENT REFERRED TO:  No follow-up provider specified.    DISCHARGE MEDICATIONS:  Current

## 2025-02-07 NOTE — CARE COORDINATION
Case Management Assessment  Initial Evaluation    Date/Time of Evaluation: 2/7/2025 3:10 PM  Assessment Completed by: ANUSHA BRYANT RN    If patient is discharged prior to next notation, then this note serves as note for discharge by case management.    Patient Name: Yary Chinchilla                   YOB: 1995  Diagnosis: Abscess of finger, right [L02.511]  Infected abrasion of right hand, initial encounter [S60.511A, L08.9]                   Date / Time: 2/6/2025  9:40 PM    Patient Admission Status: Inpatient   Readmission Risk (Low < 19, Mod (19-27), High > 27): Readmission Risk Score: 4.7    Current PCP: Jennifer May APRN - CNP  PCP verified by CM? Yes    Chart Reviewed: Yes      History Provided by: Patient  Patient Orientation: Alert and Oriented    Patient Cognition: Alert    Hospitalization in the last 30 days (Readmission):  No    If yes, Readmission Assessment in CM Navigator will be completed.    Advance Directives:      Code Status: Full Code   Patient's Primary Decision Maker is: Legal Next of Kin      Discharge Planning:    Patient lives with: Family Members Type of Home: House  Primary Care Giver: Self  Patient Support Systems include: Family Members   Current Financial resources: HELP  Current community resources: None  Current services prior to admission: Durable Medical Equipment, None            Current DME:  (none)            Type of Home Care services:  None    ADLS  Prior functional level: Independent in ADLs/IADLs  Current functional level: Independent in ADLs/IADLs    PT AM-PAC:   /24  OT AM-PAC:   /24    Family can provide assistance at DC: Yes  Would you like Case Management to discuss the discharge plan with any other family members/significant others, and if so, who? No  Plans to Return to Present Housing: Yes  Other Identified Issues/Barriers to RETURNING to current housing: none  Potential Assistance needed at discharge: Prescription Assistance              Patient  expects to discharge to: House  Plan for transportation at discharge: Family    Financial    Payor: /     Does insurance require precert for SNF: n/a    Potential assistance Purchasing Medications: Yes  Meds-to-Beds request: Yes      [id number] - [place] - [address] [phone] - F [phone]  [address] [place] [address]  Phone: [phone] Fax: [phone]      Notes:    Factors facilitating achievement of predicted outcomes: Family support    Barriers to discharge: infection    Additional Case Management Notes: home with family     The Plan for Transition of Care is related to the following treatment goals of Abscess of finger, right [L02.511]  Infected abrasion of right hand, initial encounter [S60.511A, L08.9]    IF APPLICABLE: The Patient and/or patient representative Yary and his family were provided with a choice of provider and agrees with the discharge plan. Freedom of choice list with basic dialogue that supports the patient's individualized plan of care/goals and shares the quality data associated with the providers was provided to: Patient     The Patient and/or Patient Representative Agree with the Discharge Plan? Yes    ANUSHA BRYANT RN  Case Management Department       same name as above

## 2025-02-07 NOTE — PROGRESS NOTES
The following physicians are managing this wound: orthopedic and plastic surgeons.    ET Nurse will defer wound care to the physician services    Please re-consult by ordering Wound Ostomy Eval and Treat if future wound, skin, or ostomy care assistance is needed.

## 2025-02-07 NOTE — ED PROVIDER NOTES
[facility] EMERGENCY DEPARTMENT  eMERGENCY dEPARTMENT eNCOUnter    Pt Name: Yary Chinchilla  MRN: [id number]  Birthdate 1995  Date of evaluation: 2/6/25  CHIEF COMPLAINT       Chief Complaint   Patient presents with    Headache    Swelling     Right hand (ring finger)     HISTORY OF PRESENT ILLNESS   HPI  Patient is a 29-year-old male presents emergency room complains of a headache that he has had over the past 2 days.  Patient awoke with a headache and it has been waxing waning ever since.  Worse with light exposure.  Patient also complains of right hand swelling that he has had over the past 4 days primarily along the fourth digit.  Patient denies any injury to either his head or to his hand.  Patient is right-handed.  Patient denies fevers chills chest pain shortness of breath abdominal pain nausea vomiting diarrhea constipation.    REVIEW OF SYSTEMS     Constitutional: No fever  Eye: No visual changes  Ear/Nose/Mouth/Throat: No sore throat  Respiratory: No shortness of breath  Cardiovascular: No chest pain  Gastrointestinal: No nausea, no vomiting, no diarrhea  Genitourinary: No dysuria  Musculoskeletal: As above  Integumentary: No rash  Neurologic: No dizziness, positive headache  Psychiatric: No anxiety, no depression  All systems otherwise negative.        PAST MEDICAL HISTORY     Past Medical History:   Diagnosis Date    ADHD (attention deficit hyperactivity disorder)     Asthma     Bipolar 1 disorder, depressed (HCC)     Borderline diabetic     Eczema     Hypertension     PTSD (post-traumatic stress disorder)      SURGICAL HISTORY       Past Surgical History:   Procedure Laterality Date    FINGER SURGERY       CURRENT MEDICATIONS       Previous Medications    ALBUTEROL SULFATE HFA (VENTOLIN HFA) 108 (90 BASE) MCG/ACT INHALER    Inhale 2 puffs into the lungs 4 times daily as needed for Wheezing    CYCLOBENZAPRINE (FLEXERIL) 10 MG TABLET    Take 1 tablet by mouth 3 times daily as needed for Muscle spasms  Pending)     LABS: All lab results were reviewed by myself, and all abnormals are listed below.  Labs Reviewed   SEDIMENTATION RATE - Abnormal; Notable for the following components:       Result Value    Sed Rate, Automated 37 (*)     All other components within normal limits   C-REACTIVE PROTEIN - Abnormal; Notable for the following components:    CRP 8.3 (*)     All other components within normal limits   BASIC METABOLIC PANEL - Abnormal; Notable for the following components:    Glucose 101 (*)     All other components within normal limits   CBC WITH AUTO DIFFERENTIAL - Abnormal; Notable for the following components:    RBC 6.39 (*)     MCV 69.2 (*)     MCH 22.7 (*)     Lymphocytes % 46 (*)     All other components within normal limits     EMERGENCY DEPARTMENT COURSE:   Vitals:    Vitals:    02/06/25 1829 02/06/25 2015   BP: 135/88    Pulse: 65    Resp: 16    Temp: 98.1 °F (36.7 °C)    TempSrc: Oral    SpO2: 96%    Weight: 78.5 kg (173 lb)    Height:  1.753 m (5' 9\")       The patient was given the following medications while in the emergency department:  Orders Placed This Encounter   Medications    dexAMETHasone (PF) (DECADRON) injection 10 mg    sodium chloride 0.9 % bolus 1,000 mL    diphenhydrAMINE (BENADRYL) injection 25 mg    cefTRIAXone (ROCEPHIN) 1,000 mg in sterile water 10 mL IV syringe     Order Specific Question:   Antimicrobial Indications     Answer:   Skin and Soft Tissue Infection    vancomycin (VANCOCIN) 2000 mg in 400 mL IVPB     Order Specific Question:   Antimicrobial Indications     Answer:   Skin and Soft Tissue Infection     CONSULTS:  PHARMACY TO DOSE VANCOMYCIN    FINAL IMPRESSION      1. Nonintractable headache, unspecified chronicity pattern, unspecified headache type    2. Synovitis and tenosynovitis          DISPOSITION/PLAN   DISPOSITION Decision To Transfer 02/06/2025 08:39:27 PM   DISPOSITION CONDITION Stable           PATIENT REFERRED TO:  No follow-up provider

## 2025-02-07 NOTE — ED NOTES
Yary Chinchilla [birth year]  29M Right hand swelling, 4th digit  Sed/CRP elevated  Bev wanted ER to ER  Ground vs Private auto    Accepted by Dr. brewster

## 2025-02-07 NOTE — PROGRESS NOTES
[facility]   Pharmacy Pharmacokinetic Monitoring Service - Vancomycin     Yary Chinchilla is a 29 y.o. male starting on vancomycin therapy for skin/soft tissue. Pharmacy consulted by Kevin Bourgeois  for monitoring and adjustment.    Target Concentration: Goal AUC/JESUS 400-600 mg*hr/L    Additional Antimicrobials: ceftriaxone    Pertinent Laboratory Values:   Wt Readings from Last 1 Encounters:   02/06/25 78.5 kg (173 lb)     Temp Readings from Last 1 Encounters:   02/06/25 97.9 °F (36.6 °C) (Oral)     Estimated Creatinine Clearance: 120 mL/min (based on SCr of 0.9 mg/dL).  Recent Labs     02/06/25  1923   CREATININE 0.9   BUN 10   WBC 6.5          Pertinent Cultures:  Culture Date Source Results           MRSA Nasal Swab: N/A. Non-respiratory infection.    Plan:  Dosing recommendations based on Bayesian software  Start vancomycin 2000 mg followed by 1000 mg every 8 hours.  Anticipated AUC of 539 and trough concentration of 15.9 at steady state  Renal labs as indicated   Vancomycin concentration ordered for   @     Pharmacy will continue to monitor patient and adjust therapy as indicated    Thank you for the consult,  Mitch Garcia RPH  2/7/2025 12:32 AM

## 2025-02-08 LAB
ANION GAP SERPL CALCULATED.3IONS-SCNC: 5 MMOL/L (ref 9–16)
BASOPHILS # BLD: 0.07 K/UL (ref 0–0.2)
BASOPHILS NFR BLD: 1 % (ref 0–2)
BUN SERPL-MCNC: 16 MG/DL (ref 6–20)
CALCIUM SERPL-MCNC: 9.2 MG/DL (ref 8.6–10.4)
CHLORIDE SERPL-SCNC: 105 MMOL/L (ref 98–107)
CO2 SERPL-SCNC: 25 MMOL/L (ref 20–31)
CREAT SERPL-MCNC: 1 MG/DL (ref 0.7–1.2)
CRP SERPL HS-MCNC: <3 MG/L (ref 0–5)
EOSINOPHIL # BLD: 0.15 K/UL (ref 0–0.44)
EOSINOPHILS RELATIVE PERCENT: 2 % (ref 1–4)
ERYTHROCYTE [DISTWIDTH] IN BLOOD BY AUTOMATED COUNT: 13.2 % (ref 11.8–14.4)
GFR, ESTIMATED: >90 ML/MIN/1.73M2
GLUCOSE SERPL-MCNC: 98 MG/DL (ref 74–99)
HCT VFR BLD AUTO: 40.8 % (ref 40.7–50.3)
HGB BLD-MCNC: 12.6 G/DL (ref 13–17)
IMM GRANULOCYTES # BLD AUTO: 0 K/UL (ref 0–0.3)
IMM GRANULOCYTES NFR BLD: 0 %
LYMPHOCYTES NFR BLD: 3.63 K/UL (ref 1.1–3.7)
LYMPHOCYTES RELATIVE PERCENT: 49 % (ref 24–43)
MAGNESIUM SERPL-MCNC: 1.9 MG/DL (ref 1.6–2.6)
MCH RBC QN AUTO: 21.7 PG (ref 25.2–33.5)
MCHC RBC AUTO-ENTMCNC: 30.9 G/DL (ref 28.4–34.8)
MCV RBC AUTO: 70.2 FL (ref 82.6–102.9)
MONOCYTES NFR BLD: 0.59 K/UL (ref 0.1–1.2)
MONOCYTES NFR BLD: 8 % (ref 3–12)
MORPHOLOGY: ABNORMAL
NEUTROPHILS NFR BLD: 40 % (ref 36–65)
NEUTS SEG NFR BLD: 2.96 K/UL (ref 1.5–8.1)
NRBC BLD-RTO: 0 PER 100 WBC
PLATELET # BLD AUTO: 258 K/UL (ref 138–453)
PMV BLD AUTO: 10.8 FL (ref 8.1–13.5)
POTASSIUM SERPL-SCNC: 3.6 MMOL/L (ref 3.7–5.3)
RBC # BLD AUTO: 5.81 M/UL (ref 4.21–5.77)
SODIUM SERPL-SCNC: 135 MMOL/L (ref 136–145)
WBC OTHER # BLD: 7.4 K/UL (ref 3.5–11.3)

## 2025-02-08 PROCEDURE — 36415 COLL VENOUS BLD VENIPUNCTURE: CPT

## 2025-02-08 PROCEDURE — 6370000000 HC RX 637 (ALT 250 FOR IP): Performed by: STUDENT IN AN ORGANIZED HEALTH CARE EDUCATION/TRAINING PROGRAM

## 2025-02-08 PROCEDURE — 99232 SBSQ HOSP IP/OBS MODERATE 35: CPT | Performed by: INTERNAL MEDICINE

## 2025-02-08 PROCEDURE — 80048 BASIC METABOLIC PNL TOTAL CA: CPT

## 2025-02-08 PROCEDURE — 85025 COMPLETE CBC W/AUTO DIFF WBC: CPT

## 2025-02-08 PROCEDURE — 6370000000 HC RX 637 (ALT 250 FOR IP): Performed by: INTERNAL MEDICINE

## 2025-02-08 PROCEDURE — 6370000000 HC RX 637 (ALT 250 FOR IP): Performed by: NURSE PRACTITIONER

## 2025-02-08 PROCEDURE — 99232 SBSQ HOSP IP/OBS MODERATE 35: CPT | Performed by: STUDENT IN AN ORGANIZED HEALTH CARE EDUCATION/TRAINING PROGRAM

## 2025-02-08 PROCEDURE — 86140 C-REACTIVE PROTEIN: CPT

## 2025-02-08 PROCEDURE — 1200000000 HC SEMI PRIVATE

## 2025-02-08 PROCEDURE — 2500000003 HC RX 250 WO HCPCS: Performed by: ORTHOPAEDIC SURGERY

## 2025-02-08 PROCEDURE — 83735 ASSAY OF MAGNESIUM: CPT

## 2025-02-08 PROCEDURE — 2500000003 HC RX 250 WO HCPCS: Performed by: NURSE PRACTITIONER

## 2025-02-08 RX ORDER — LINEZOLID 600 MG/1
600 TABLET, FILM COATED ORAL EVERY 12 HOURS SCHEDULED
Qty: 17 TABLET | Refills: 0 | Status: SHIPPED | OUTPATIENT
Start: 2025-02-08 | End: 2025-02-08 | Stop reason: HOSPADM

## 2025-02-08 RX ORDER — OXYCODONE HYDROCHLORIDE 5 MG/1
5 TABLET ORAL EVERY 4 HOURS PRN
Status: DISCONTINUED | OUTPATIENT
Start: 2025-02-08 | End: 2025-02-09 | Stop reason: HOSPADM

## 2025-02-08 RX ORDER — POTASSIUM CHLORIDE 1500 MG/1
40 TABLET, EXTENDED RELEASE ORAL ONCE
Status: COMPLETED | OUTPATIENT
Start: 2025-02-08 | End: 2025-02-08

## 2025-02-08 RX ORDER — LEVOFLOXACIN 500 MG/1
500 TABLET, FILM COATED ORAL DAILY
Qty: 10 TABLET | Refills: 0 | Status: SHIPPED | OUTPATIENT
Start: 2025-02-08 | End: 2025-02-18

## 2025-02-08 RX ORDER — DOXYCYCLINE HYCLATE 100 MG
100 TABLET ORAL 2 TIMES DAILY
Qty: 20 TABLET | Refills: 0 | Status: SHIPPED | OUTPATIENT
Start: 2025-02-08 | End: 2025-02-18

## 2025-02-08 RX ORDER — OXYCODONE HYDROCHLORIDE 5 MG/1
5 TABLET ORAL EVERY 8 HOURS PRN
Qty: 9 TABLET | Refills: 0 | Status: SHIPPED | OUTPATIENT
Start: 2025-02-08 | End: 2025-02-11

## 2025-02-08 RX ADMIN — OXYCODONE HYDROCHLORIDE AND ACETAMINOPHEN 1 TABLET: 5; 325 TABLET ORAL at 04:34

## 2025-02-08 RX ADMIN — GABAPENTIN 300 MG: 300 CAPSULE ORAL at 08:14

## 2025-02-08 RX ADMIN — LINEZOLID 600 MG: 600 TABLET, FILM COATED ORAL at 21:13

## 2025-02-08 RX ADMIN — GABAPENTIN 300 MG: 300 CAPSULE ORAL at 21:13

## 2025-02-08 RX ADMIN — BUTALBITAL, ACETAMINOPHEN, AND CAFFEINE 1 TABLET: 50; 325; 40 TABLET ORAL at 14:10

## 2025-02-08 RX ADMIN — SODIUM CHLORIDE 1000 ML: 900 IRRIGANT IRRIGATION at 22:30

## 2025-02-08 RX ADMIN — SODIUM CHLORIDE 1000 ML: 900 IRRIGANT IRRIGATION at 16:00

## 2025-02-08 RX ADMIN — GABAPENTIN 300 MG: 300 CAPSULE ORAL at 14:10

## 2025-02-08 RX ADMIN — POTASSIUM CHLORIDE 40 MEQ: 1500 TABLET, EXTENDED RELEASE ORAL at 08:13

## 2025-02-08 RX ADMIN — OXYCODONE HYDROCHLORIDE AND ACETAMINOPHEN 2 TABLET: 5; 325 TABLET ORAL at 08:09

## 2025-02-08 RX ADMIN — LINEZOLID 600 MG: 600 TABLET, FILM COATED ORAL at 08:13

## 2025-02-08 RX ADMIN — SODIUM CHLORIDE, PRESERVATIVE FREE 10 ML: 5 INJECTION INTRAVENOUS at 21:14

## 2025-02-08 RX ADMIN — OXYCODONE 5 MG: 5 TABLET ORAL at 14:10

## 2025-02-08 ASSESSMENT — PAIN SCALES - GENERAL
PAINLEVEL_OUTOF10: 5
PAINLEVEL_OUTOF10: 8
PAINLEVEL_OUTOF10: 9
PAINLEVEL_OUTOF10: 4
PAINLEVEL_OUTOF10: 7

## 2025-02-08 ASSESSMENT — PAIN DESCRIPTION - ONSET: ONSET: ON-GOING

## 2025-02-08 ASSESSMENT — PAIN DESCRIPTION - ORIENTATION
ORIENTATION: RIGHT

## 2025-02-08 ASSESSMENT — PAIN DESCRIPTION - LOCATION
LOCATION: FINGER (COMMENT WHICH ONE)
LOCATION: FINGER (COMMENT WHICH ONE)
LOCATION: HAND

## 2025-02-08 ASSESSMENT — PAIN - FUNCTIONAL ASSESSMENT: PAIN_FUNCTIONAL_ASSESSMENT: PREVENTS OR INTERFERES SOME ACTIVE ACTIVITIES AND ADLS

## 2025-02-08 ASSESSMENT — PAIN DESCRIPTION - DESCRIPTORS
DESCRIPTORS: THROBBING
DESCRIPTORS: STABBING

## 2025-02-08 ASSESSMENT — PAIN DESCRIPTION - FREQUENCY: FREQUENCY: CONTINUOUS

## 2025-02-08 ASSESSMENT — PAIN DESCRIPTION - PAIN TYPE: TYPE: ACUTE PAIN

## 2025-02-08 NOTE — DISCHARGE INSTRUCTIONS
Follow up with your PCP in 3 days. Call for an appointment as soon as possible.  Follow-up with specialist as instructed.  Call for an appointment as soon as possible.  - F/u with Dr. Garcia in his office on 2/18/2025 > Call to verify appointment.   Medications as instructed.  Return to the emergency department immediately for any new or worsening concerns.

## 2025-02-08 NOTE — PROGRESS NOTES
[facility]  Progress Note    Yary Chinchilla  MRN: [id number]  Date: 2/8/2025  LOS: 1     Reason for F/U :   Right hand fourth finger abscess    Impression :   Right hand ring finger abscess   status post I&D 2/6/2025  Essential hypertension  Bipolar disorder  ADHD  PTSD    Recommendations:   The abscess drainage so far does not yield any organisms on culture  The plan is for discharge today and his insurance will not cover linezolid  The plan was to switch to oral levofloxacin and doxycycline and completed 10-day course of therapy  Continue local wound care.  The plastic/orthopedic surgery team    Infection Control Recommendations:   Universal precautions    Discharge Planning:   Patient will need Midline Catheter Insertion/ PICC line Insertion: No  Patient will need: Home IV , Infusion Center,  SNF,  LTAC: Undetermined  Patient willneed outpatient wound care: No    Medical Decision making / Summary of Stay:   Yary Chinchilla is a 29 y.o.-year-old male who was initially admitted on 2/6/2025. Patient seen at the request of Dr. Mcbride     INITIAL HISTORY:     This patient presented to the ED on 2/6/25 with complaints of right 4th finger pain and swelling. 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 He is a poor historian.      In the ED he was noted to have a small puncture wound to the dorsum of the right hand over the middle phalanx. There was expressible purulence. Compartments soft and easily compressible.      Labwork was unremarkable other than a CRP of 8.3 and ESR 37.     XR of the right hand showed fracture of the 5th metacarpal was noted on the previous study.  It is healed with deformity.  Ulnar positive variance.  No visualized fracture or dislocation.     IV Vancomycin was initiated and the patient underwent bedside I&D with packing per Plastic Surgery on 2/6/25. Saline soaks were ordered as the patient has a betadine allergy.     ID was consulted for patient evaluation and

## 2025-02-08 NOTE — PLAN OF CARE
Problem: Discharge Planning  Goal: Discharge to home or other facility with appropriate resources  Outcome: Progressing     Problem: Pain  Goal: Verbalizes/displays adequate comfort level or baseline comfort level  Outcome: Progressing     Problem: Neurosensory - Adult  Goal: Achieves stable or improved neurological status  Outcome: Progressing  Goal: Absence of seizures  Outcome: Progressing  Goal: Remains free of injury related to seizures activity  Outcome: Progressing  Goal: Achieves maximal functionality and self care  Outcome: Progressing     Problem: Skin/Tissue Integrity - Adult  Goal: Skin integrity remains intact  Outcome: Progressing  Goal: Incisions, wounds, or drain sites healing without S/S of infection  Outcome: Progressing  Goal: Oral mucous membranes remain intact  Outcome: Progressing     Problem: Musculoskeletal - Adult  Goal: Return mobility to safest level of function  Outcome: Progressing  Goal: Maintain proper alignment of affected body part  Outcome: Progressing  Goal: Return ADL status to a safe level of function  Outcome: Progressing     Problem: Infection - Adult  Goal: Absence of infection at discharge  Outcome: Progressing  Goal: Absence of infection during hospitalization  Outcome: Progressing  Goal: Absence of fever/infection during anticipated neutropenic period  Outcome: Progressing     Problem: ABCDS Injury Assessment  Goal: Absence of physical injury  Outcome: Progressing

## 2025-02-08 NOTE — CARE COORDINATION
Transitional planning-Zyvox needed prior auth thru covermymeds-denied by insurance. Nurse Gwendolyn notified. PS Dr. Vela. Her response-Dr Oconnor’s to see today. I ll forward

## 2025-02-08 NOTE — PROGRESS NOTES
hours.    I/O (24Hr):    Intake/Output Summary (Last 24 hours) at 2/8/2025 0722  Last data filed at 2/8/2025 0440  Gross per 24 hour   Intake 2660 ml   Output --   Net 2660 ml       Labs:  Hematology:  Recent Labs     02/06/25 1923 02/07/25  0511 02/08/25  0434   WBC 6.5 4.3 7.4   RBC 6.39* 6.00* 5.81*   HGB 14.5 13.0 12.6*   HCT 44.2 42.2 40.8   MCV 69.2* 70.3* 70.2*   MCH 22.7* 21.7* 21.7*   MCHC 32.8 30.8 30.9   RDW 13.3 13.4 13.2    292 258   MPV 10.3 11.7 10.8   SEDRATE 37*  --   --    CRP 8.3*  --  <3.0     Chemistry:  Recent Labs     02/06/25 1923 02/07/25  0511 02/08/25  0434    136 135*   K 4.0 4.5 3.6*    104 105   CO2 22 18* 25   GLUCOSE 101* 145* 98   BUN 10 12 16   CREATININE 0.9 0.9 1.0   MG  --  2.1 1.9   ANIONGAP 13 14 5*   LABGLOM >90 >90 >90   CALCIUM 9.4 9.2 9.2     Recent Labs     02/07/25  0511   LABA1C 5.7   URICACID 4.6     ABG:No results found for: \"POCPH\", \"PHART\", \"PH\", \"POCPCO2\", \"RZT5MTP\", \"PCO2\", \"POCPO2\", \"PO2ART\", \"PO2\", \"POCHCO3\", \"HUM3BIB\", \"HCO3\", \"NBEA\", \"PBEA\", \"BEART\", \"BE\", \"THGBART\", \"THB\", \"SFU7WIH\", \"FMDF6LBF\", \"F2QOCHWO\", \"O2SAT\", \"FIO2\"  Lab Results   Component Value Date/Time    SPECIAL NOT REPORTED 12/28/2012 09:17 PM     Lab Results   Component Value Date/Time    CULTURE CULTURE IN PROGRESS 02/07/2025 12:48 AM       Radiology:  XR HAND RIGHT (MIN 3 VIEWS)    Result Date: 2/6/2025  No acute fracture     Physical Examination:        General appearance:  alert, cooperative and no distress  Mental Status:  oriented to person, place and time and normal affect  Lungs:  clear to auscultation bilaterally, normal effort  Heart:  regular rate and rhythm, no murmur  Abdomen:  soft, nontender, nondistended  Extremities:  Mild swelling to the right forth PIP.   Skin:  Minimal erythema to right 4th PIP. Tender to palpation. No drainage. Packing removed today.     Assessment:        Hospital Problems             Last Modified POA    * (Principal) Abscess of finger,  right 2/7/2025 Yes    Infected abrasion of right hand 2/7/2025 Yes    Attention deficit hyperactivity disorder (ADHD) 2/7/2025 Yes    Bipolar 1 disorder (HCC) 2/7/2025 Yes    Abscess of finger of right hand 2/7/2025 Yes     Plan:        Right fourth digit dorsal abscess. Evaluated by infectious disease yesterday with recommendations for p.o. Zyvox 600 mg twie daily x 10 days.  Status post I&D by plastic surgery and okay for discharge today. CRP 8.3 down to less than 3.0.    Hypokalemia. 3.6 > replaced with 40 mEq.     Anemia. Hemoglobin 14.5 > 12.6 during admission. Likely hemo dilutional with IVF. No obvious source of bleeding.      ADHD. Continue supportive care.     Bipolar disorder. Continue supportive care.     History of C-spine radiculopathy with chronic right shoulder pain. Continue Gabapentin 300 mg TID.     DVT prophylaxis: Lovenox.   GI prophylaxis: none.     Discharge planning: Discharge home today.    Michel Mcbride DO  2/8/2025  7:22 AM    > <

## 2025-02-08 NOTE — PROGRESS NOTES
Plastic surgery progress Note    Patient:  Yary Chinchilla  YOB: 1995     29 y.o. male    Subjective:  Patient seen and examined this morning. No complaints or concerns. No issues overnight per nursing. Pain is well controlled on current regimen. Denies fever, HA, CP, SOB, N/V, dysuria, new numbness/tingling.  Patient has been doing his routine soaks and dressing changes.  Patient had packing in place.    Vitals reviewed, afebrile    Objective:   Vitals:    02/07/25 2205   BP: 130/78   Pulse: 60   Resp: 18   Temp: 97.9 °F (36.6 °C)   SpO2: 100%     Gen: NAD, cooperative    Cardiovascular: Regular rate   Respiratory: No acute respiratory distress, breathing comfortably    Orthopedic Exam  RUE: Dressings are clean/dry/intact without strikethrough.  Dressings were taken down for evaluation.  Packing was in place with scant sanguinous drainage seen.  Packing was pulled today.  No expressible purulence was able to be expressed.  No palpable fluctuance.  Patient is mildly tender diffusely through the PIP at the dorsal aspect.  Patient denies any pain along the flexor tendon or the A1 pulley.  Patient states that he has no motor of his fourth and fifth digits from nerve injury previously.  Patient cannot actively flex his fourth or fifth digits but was able to be passively flex without difficulty.  Compartments soft and compressible.  Ulnar/median/AIN/PIN/radial motor intact.  Axillary/MCN/median/ulnar/radial nerves SALT.  Hand is warm well-perfused with BCR.        Recent Labs     02/08/25  0434   WBC 7.4   HGB 12.6*   HCT 40.8      *   K 3.6*   BUN 16   CREATININE 1.0   GLUCOSE 98      Meds:   Abx: Linezolid  DVT ppx: Lovenox 40 mg twice daily  See rec for complete list    Impression 29 y.o. male  being seen for:    -Right fourth digit dorsal abscess    Plan  - No further plans for plastic surgery intervention at this time  -Packing was pulled from fourth digit this morning without  difficulty.  -At this time patient's clinical status has drastically improved and feel it is appropriate for him to be discharged.  Infectious disease recommends oral antibiotics for 10 days with linezolid 600 mg twice daily.  - Soft dressings on RUE.  Okay to reinforce/maintain by nursing if necessary. Please notify Ortho if saturated.  - WBAT  to the RUE  - Pain control and medical management per primary: Internal medicine  - Multimodal pain control ordered   - DVT ppx: Lovenox 40 mg twice daily   - Ice/Elevate to control pain and edema  - Diet: Adult diet okay from orthopedic perspective   - Okay to discharge home from plastic surgery perspective   -I have discussed this plan with Dr. Garcia and he agrees.  - F/u with Dr. Garcia in his office on 2/18/2025   - Please page Ortho on call with any questions        Juanito Ivory, DO  Orthopedic Surgery, PGY-1  [place], [place]

## 2025-02-08 NOTE — PLAN OF CARE
Problem: Discharge Planning  Goal: Discharge to home or other facility with appropriate resources  2/8/2025 1820 by Gwendolyn Bravo, RN  Outcome: Progressing  2/8/2025 0454 by Zulay Jones RN  Outcome: Progressing     Problem: Pain  Goal: Verbalizes/displays adequate comfort level or baseline comfort level  2/8/2025 1820 by Gwendolyn Bravo, RN  Outcome: Progressing  2/8/2025 0454 by Zulay Jones RN  Outcome: Progressing     Problem: Neurosensory - Adult  Goal: Achieves stable or improved neurological status  2/8/2025 1820 by Gwendolyn Bravo, RN  Outcome: Progressing  2/8/2025 0454 by Zulay Jones RN  Outcome: Progressing  Goal: Absence of seizures  2/8/2025 0454 by Zulay Jones RN  Outcome: Progressing  Goal: Remains free of injury related to seizures activity  2/8/2025 0454 by Zulay Jones RN  Outcome: Progressing  Goal: Achieves maximal functionality and self care  2/8/2025 0454 by Zulay Jones RN  Outcome: Progressing     Problem: Neurosensory - Adult  Goal: Achieves stable or improved neurological status  2/8/2025 1820 by Gwendolyn Bravo, RN  Outcome: Progressing  2/8/2025 0454 by Zulay Jones RN  Outcome: Progressing  Goal: Absence of seizures  2/8/2025 0454 by Zluay Jones RN  Outcome: Progressing  Goal: Remains free of injury related to seizures activity  2/8/2025 0454 by Zulay Jones RN  Outcome: Progressing  Goal: Achieves maximal functionality and self care  2/8/2025 0454 by Zulay Jones RN  Outcome: Progressing     Problem: Pain  Goal: Verbalizes/displays adequate comfort level or baseline comfort level  2/8/2025 1820 by Gwendolyn Bravo, RN  Outcome: Progressing  2/8/2025 0454 by Zulay Jones RN  Outcome: Progressing

## 2025-02-09 VITALS
WEIGHT: 173 LBS | TEMPERATURE: 98.4 F | RESPIRATION RATE: 18 BRPM | HEIGHT: 69 IN | BODY MASS INDEX: 25.62 KG/M2 | SYSTOLIC BLOOD PRESSURE: 129 MMHG | HEART RATE: 67 BPM | OXYGEN SATURATION: 100 % | DIASTOLIC BLOOD PRESSURE: 78 MMHG

## 2025-02-09 LAB
ANION GAP SERPL CALCULATED.3IONS-SCNC: 9 MMOL/L (ref 9–16)
BASOPHILS # BLD: 0.09 K/UL (ref 0–0.2)
BASOPHILS NFR BLD: 1 % (ref 0–2)
BUN SERPL-MCNC: 14 MG/DL (ref 6–20)
CALCIUM SERPL-MCNC: 9.4 MG/DL (ref 8.6–10.4)
CHLORIDE SERPL-SCNC: 103 MMOL/L (ref 98–107)
CO2 SERPL-SCNC: 26 MMOL/L (ref 20–31)
CREAT SERPL-MCNC: 1.1 MG/DL (ref 0.7–1.2)
EOSINOPHIL # BLD: 0.18 K/UL (ref 0–0.44)
EOSINOPHILS RELATIVE PERCENT: 2 % (ref 1–4)
ERYTHROCYTE [DISTWIDTH] IN BLOOD BY AUTOMATED COUNT: 13.3 % (ref 11.8–14.4)
GFR, ESTIMATED: >90 ML/MIN/1.73M2
GLUCOSE SERPL-MCNC: 89 MG/DL (ref 74–99)
HCT VFR BLD AUTO: 41.9 % (ref 40.7–50.3)
HGB BLD-MCNC: 13.1 G/DL (ref 13–17)
IMM GRANULOCYTES # BLD AUTO: 0 K/UL (ref 0–0.3)
IMM GRANULOCYTES NFR BLD: 0 %
LYMPHOCYTES NFR BLD: 3.4 K/UL (ref 1.1–3.7)
LYMPHOCYTES RELATIVE PERCENT: 37 % (ref 24–43)
MAGNESIUM SERPL-MCNC: 2 MG/DL (ref 1.6–2.6)
MCH RBC QN AUTO: 21.9 PG (ref 25.2–33.5)
MCHC RBC AUTO-ENTMCNC: 31.3 G/DL (ref 28.4–34.8)
MCV RBC AUTO: 69.9 FL (ref 82.6–102.9)
MONOCYTES NFR BLD: 0.46 K/UL (ref 0.1–1.2)
MONOCYTES NFR BLD: 5 % (ref 3–12)
MORPHOLOGY: ABNORMAL
NEUTROPHILS NFR BLD: 55 % (ref 36–65)
NEUTS SEG NFR BLD: 5.07 K/UL (ref 1.5–8.1)
NRBC BLD-RTO: 0 PER 100 WBC
PLATELET # BLD AUTO: 275 K/UL (ref 138–453)
PMV BLD AUTO: 10.7 FL (ref 8.1–13.5)
POTASSIUM SERPL-SCNC: 4.1 MMOL/L (ref 3.7–5.3)
RBC # BLD AUTO: 5.99 M/UL (ref 4.21–5.77)
SODIUM SERPL-SCNC: 138 MMOL/L (ref 136–145)
WBC OTHER # BLD: 9.2 K/UL (ref 3.5–11.3)

## 2025-02-09 PROCEDURE — 85025 COMPLETE CBC W/AUTO DIFF WBC: CPT

## 2025-02-09 PROCEDURE — 6370000000 HC RX 637 (ALT 250 FOR IP): Performed by: NURSE PRACTITIONER

## 2025-02-09 PROCEDURE — 2500000003 HC RX 250 WO HCPCS: Performed by: ORTHOPAEDIC SURGERY

## 2025-02-09 PROCEDURE — 83735 ASSAY OF MAGNESIUM: CPT

## 2025-02-09 PROCEDURE — 36415 COLL VENOUS BLD VENIPUNCTURE: CPT

## 2025-02-09 PROCEDURE — 99239 HOSP IP/OBS DSCHRG MGMT >30: CPT | Performed by: STUDENT IN AN ORGANIZED HEALTH CARE EDUCATION/TRAINING PROGRAM

## 2025-02-09 PROCEDURE — 6370000000 HC RX 637 (ALT 250 FOR IP): Performed by: INTERNAL MEDICINE

## 2025-02-09 PROCEDURE — 80048 BASIC METABOLIC PNL TOTAL CA: CPT

## 2025-02-09 PROCEDURE — 6370000000 HC RX 637 (ALT 250 FOR IP): Performed by: STUDENT IN AN ORGANIZED HEALTH CARE EDUCATION/TRAINING PROGRAM

## 2025-02-09 RX ADMIN — OXYCODONE 5 MG: 5 TABLET ORAL at 01:36

## 2025-02-09 RX ADMIN — GABAPENTIN 300 MG: 300 CAPSULE ORAL at 09:43

## 2025-02-09 RX ADMIN — CYCLOBENZAPRINE 10 MG: 10 TABLET, FILM COATED ORAL at 09:54

## 2025-02-09 RX ADMIN — LINEZOLID 600 MG: 600 TABLET, FILM COATED ORAL at 09:44

## 2025-02-09 RX ADMIN — SODIUM CHLORIDE 1000 ML: 900 IRRIGANT IRRIGATION at 05:05

## 2025-02-09 RX ADMIN — SODIUM CHLORIDE 1000 ML: 900 IRRIGANT IRRIGATION at 10:00

## 2025-02-09 ASSESSMENT — PAIN SCALES - GENERAL
PAINLEVEL_OUTOF10: 7
PAINLEVEL_OUTOF10: 4
PAINLEVEL_OUTOF10: 5
PAINLEVEL_OUTOF10: 7

## 2025-02-09 ASSESSMENT — PAIN - FUNCTIONAL ASSESSMENT: PAIN_FUNCTIONAL_ASSESSMENT: PREVENTS OR INTERFERES SOME ACTIVE ACTIVITIES AND ADLS

## 2025-02-09 ASSESSMENT — PAIN DESCRIPTION - LOCATION
LOCATION: HAND
LOCATION: HAND

## 2025-02-09 ASSESSMENT — PAIN DESCRIPTION - ORIENTATION
ORIENTATION: RIGHT
ORIENTATION: RIGHT

## 2025-02-09 ASSESSMENT — PAIN DESCRIPTION - FREQUENCY: FREQUENCY: CONTINUOUS

## 2025-02-09 ASSESSMENT — PAIN DESCRIPTION - DESCRIPTORS
DESCRIPTORS: ACHING
DESCRIPTORS: ACHING

## 2025-02-09 ASSESSMENT — PAIN DESCRIPTION - ONSET: ONSET: ON-GOING

## 2025-02-09 ASSESSMENT — PAIN DESCRIPTION - PAIN TYPE: TYPE: ACUTE PAIN

## 2025-02-09 NOTE — PLAN OF CARE
Problem: Discharge Planning  Goal: Discharge to home or other facility with appropriate resources  2/9/2025 1052 by Lyndsay Canales RN  Outcome: Adequate for Discharge  2/9/2025 0543 by Zulay Jones RN  Outcome: Progressing     Problem: Pain  Goal: Verbalizes/displays adequate comfort level or baseline comfort level  2/9/2025 1052 by Lyndsay Canales RN  Outcome: Adequate for Discharge  2/9/2025 0543 by Zulay Jones RN  Outcome: Progressing     Problem: Neurosensory - Adult  Goal: Achieves stable or improved neurological status  2/9/2025 1052 by Lyndsay Canales RN  Outcome: Adequate for Discharge  2/9/2025 0543 by Zulay Jones RN  Outcome: Progressing  Goal: Absence of seizures  2/9/2025 1052 by Lyndsay Canales RN  Outcome: Adequate for Discharge  2/9/2025 0543 by Zulay Jones RN  Outcome: Progressing  Goal: Remains free of injury related to seizures activity  2/9/2025 1052 by Lyndsay Canales RN  Outcome: Adequate for Discharge  2/9/2025 0543 by Zulay Jones RN  Outcome: Progressing  Goal: Achieves maximal functionality and self care  2/9/2025 1052 by Lyndsay Canales RN  Outcome: Adequate for Discharge  2/9/2025 0543 by Zulay Jones RN  Outcome: Progressing     Problem: Skin/Tissue Integrity - Adult  Goal: Skin integrity remains intact  2/9/2025 1052 by Lyndsay Canales RN  Outcome: Adequate for Discharge  2/9/2025 0543 by Zulay Jones RN  Outcome: Progressing  Goal: Incisions, wounds, or drain sites healing without S/S of infection  2/9/2025 1052 by Lyndsay Canales RN  Outcome: Adequate for Discharge  2/9/2025 0543 by Zulay Jones RN  Outcome: Progressing  Goal: Oral mucous membranes remain intact  2/9/2025 1052 by Lyndsay Canales RN  Outcome: Adequate for Discharge  2/9/2025 0543 by Zulay Jones RN  Outcome: Progressing     Problem: Musculoskeletal - Adult  Goal: Return mobility to safest level of function  2/9/2025 1052 by Lyndsay Canales  MOSHE SUMMERS  Outcome: Adequate for Discharge  2/9/2025 0543 by Zulay Jones RN  Outcome: Progressing  Goal: Maintain proper alignment of affected body part  2/9/2025 1052 by Lyndsay Canales RN  Outcome: Adequate for Discharge  2/9/2025 0543 by Zulay Jones RN  Outcome: Progressing  Goal: Return ADL status to a safe level of function  2/9/2025 1052 by Lyndsay Canales RN  Outcome: Adequate for Discharge  2/9/2025 0543 by Zulay Jones RN  Outcome: Progressing     Problem: Infection - Adult  Goal: Absence of infection at discharge  2/9/2025 1052 by Lyndsay Canales RN  Outcome: Adequate for Discharge  2/9/2025 0543 by Zulay Jones RN  Outcome: Progressing  Goal: Absence of infection during hospitalization  2/9/2025 1052 by Lyndsay Canales RN  Outcome: Adequate for Discharge  2/9/2025 0543 by Zulay Jones RN  Outcome: Progressing  Goal: Absence of fever/infection during anticipated neutropenic period  2/9/2025 1052 by Lyndsay Canales RN  Outcome: Adequate for Discharge  2/9/2025 0543 by Zulay Jones RN  Outcome: Progressing     Problem: ABCDS Injury Assessment  Goal: Absence of physical injury  2/9/2025 1052 by Lyndsay Canales RN  Outcome: Adequate for Discharge  2/9/2025 0543 by Zulay Jones RN  Outcome: Progressing

## 2025-02-09 NOTE — PROGRESS NOTES
[facility]  Office: [phone]  Noé You DO, Mane Hanley DO, Ignacio Ramos DO, Kali Renae DO, Yazan Coates MD, Alicja Hobson MD, Tee Larios MD, Allie Franklin MD,  Aaron Sun MD, Rick Mabry MD, Chantelle Pascal MD,  Radha Chaidez DO, Andriy Pablo MD, Chris Gonzalez MD, Tadeo You DO, Jeni Nguyen MD,  Michel Mcbride DO, Bri Lima MD, Jovanna Peck MD, Ainsley Hopkins MD, Bebe Jamison MD,  Ashutosh Denis MD, Davon Sánchez MD, Subhash Weller MD, Zenobia Mccormack MD, Adalberto Medley MD, Nj Monahan MD, Sami Guzman DO, Eber Oliver MD, Radha Hawk MD, Mohsin Reza, MD, Shirley Waterhouse, CNP,  Elle Pimentel CNP, Sami Humphries, CNP,  Araceli Wiseman, DNP, Cherelle Hubbard, CNP, Joleen Saravia, CNP, Maureen Leary, CNP, Sia Pineda CNP, Livia Sánchez, PA-C, Caitlin Brush, CNP, Liam Anna, CNP,  Khushboo Lion, CNP, Nica Mcadams, CNP, Clarisse Williamson, CNP,  Obdulia Galeano, CNS, Caridad Martinez CNP, Domonique Jeffrey CNP,   Silva Shrestha, CNP         [facility]   IN-PATIENT SERVICE   [facility]    Progress Note    2/9/2025    7:27 AM    Name:   Yary Chinchilla  MRN:     [id number]     Acct:      [id number]   Room:   0241/0241-01  IP Day:  2  Admit Date:  2/6/2025  9:40 PM    PCP:   Jennifer May APRN - CNP  Code Status:  Full Code    Subjective:     C/C:   Chief Complaint   Patient presents with    Hand Pain     Right hand pain     Interval History Status: improved.     Vitals reviewed, afebrile and hemodynamically stable.  Saturating well on room air.  Labs reviewed.  Overnight patient had no significant events.    On examination patient had no significant events. Continue to complain of pain. No erythema or drainage today. Initial plan for discharge 2/8/2025 however issue with obtaining antibiotics.  Discharge home today.    Brief History:     Is a 29-year-old male with a history of C-spine radiculopathy,

## 2025-02-09 NOTE — PROGRESS NOTES
Patient discharged at 1122. Patient educated and given discharge instructions. All questions answered. IV removed. Patient left with all belongings.

## 2025-02-09 NOTE — PLAN OF CARE
Problem: Discharge Planning  Goal: Discharge to home or other facility with appropriate resources  2/9/2025 0543 by Zulay Jones RN  Outcome: Progressing  2/8/2025 1820 by Gwendolyn Bravo RN  Outcome: Progressing     Problem: Pain  Goal: Verbalizes/displays adequate comfort level or baseline comfort level  2/9/2025 0543 by Zulay Jones RN  Outcome: Progressing  2/8/2025 1820 by Gwendolyn Bravo RN  Outcome: Progressing     Problem: Neurosensory - Adult  Goal: Achieves stable or improved neurological status  2/9/2025 0543 by Zulay Jones RN  Outcome: Progressing  2/8/2025 1820 by Gwendolyn Bravo, RN  Outcome: Progressing  Goal: Absence of seizures  Outcome: Progressing  Goal: Remains free of injury related to seizures activity  Outcome: Progressing  Goal: Achieves maximal functionality and self care  Outcome: Progressing     Problem: Skin/Tissue Integrity - Adult  Goal: Skin integrity remains intact  2/9/2025 0543 by Zulay Jones RN  Outcome: Progressing  2/8/2025 1820 by Gwendolyn Bravo RN  Outcome: Progressing  Goal: Incisions, wounds, or drain sites healing without S/S of infection  Outcome: Progressing  Goal: Oral mucous membranes remain intact  Outcome: Progressing     Problem: Musculoskeletal - Adult  Goal: Return mobility to safest level of function  2/9/2025 0543 by Zulay Jones RN  Outcome: Progressing  2/8/2025 1820 by Gwendolyn Bravo RN  Outcome: Progressing  Goal: Maintain proper alignment of affected body part  Outcome: Progressing  Goal: Return ADL status to a safe level of function  Outcome: Progressing     Problem: Infection - Adult  Goal: Absence of infection at discharge  2/9/2025 0543 by Zulay Jones RN  Outcome: Progressing  2/8/2025 1820 by Gwendolyn Bravo RN  Outcome: Progressing  Goal: Absence of infection during hospitalization  Outcome: Progressing  Goal: Absence of fever/infection during anticipated neutropenic period  Outcome: Progressing     Problem: ABCDS Injury  Assessment  Goal: Absence of physical injury  2/9/2025 0543 by Zulay Jones, RN  Outcome: Progressing  2/8/2025 1820 by Gwendolyn Bravo, RN  Outcome: Progressing

## 2025-02-10 ENCOUNTER — TELEPHONE (OUTPATIENT)
Dept: NEUROLOGY | Age: 30
End: 2025-02-10

## 2025-02-10 NOTE — TELEPHONE ENCOUNTER
02/10/24  Per [employer] the Patient's Medicaid is Inactive.    Health Benefit Plan Coverage  Inactive  Sponsor [place] MEDICAID   Contact [facility]   Phone [phone]   Email [email or web address]   Eligibility Date(s) 02/10/2025 - 02/28/2025   Ins. Type Medicaid

## 2025-02-11 ENCOUNTER — OFFICE VISIT (OUTPATIENT)
Dept: NEUROLOGY | Age: 30
End: 2025-02-11
Payer: COMMERCIAL

## 2025-02-11 VITALS
WEIGHT: 158.6 LBS | HEIGHT: 69 IN | SYSTOLIC BLOOD PRESSURE: 107 MMHG | DIASTOLIC BLOOD PRESSURE: 68 MMHG | BODY MASS INDEX: 23.49 KG/M2 | HEART RATE: 60 BPM

## 2025-02-11 DIAGNOSIS — G56.01 CARPAL TUNNEL SYNDROME OF RIGHT WRIST: ICD-10-CM

## 2025-02-11 DIAGNOSIS — M54.12 CERVICAL RADICULOPATHY: Primary | ICD-10-CM

## 2025-02-11 DIAGNOSIS — G89.29 CHRONIC RIGHT SHOULDER PAIN: ICD-10-CM

## 2025-02-11 DIAGNOSIS — R29.898 SHOULDER WEAKNESS: ICD-10-CM

## 2025-02-11 DIAGNOSIS — M25.511 CHRONIC RIGHT SHOULDER PAIN: ICD-10-CM

## 2025-02-11 PROCEDURE — 99214 OFFICE O/P EST MOD 30 MIN: CPT | Performed by: PSYCHIATRY & NEUROLOGY
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
## 2025-02-12 ENCOUNTER — TELEPHONE (OUTPATIENT)
Dept: NEUROLOGY | Age: 30
End: 2025-02-12

## 2025-02-12 LAB
MICROORGANISM SPEC CULT: NORMAL
MICROORGANISM SPEC CULT: NORMAL
MICROORGANISM/AGENT SPEC: NORMAL
MICROORGANISM/AGENT SPEC: NORMAL
SPECIMEN DESCRIPTION: NORMAL

## 2025-02-12 NOTE — TELEPHONE ENCOUNTER
Patients  Zainab called in. Patient states he tried to  his Gabapentin and the pharmacy is telling him he just picked it up on Monday and they are not going to fill it, he states he has never picked it up. Zainab is wondering if we can call and find out from the pharmacy what is going on.     I have left two messages with [facility] to see if they can run an OARS report on this and see if he did in fact  medication recently. If he did then it is up to the pharmacist if they will fill it or not, if he is due for a refill, we need to review chart and make sure a refill is appropriate. Waiting for [facility] to call back.     Zainab would like a call back once we have an update at [phone] ext. [phone]

## 2025-02-13 ENCOUNTER — OFFICE VISIT (OUTPATIENT)
Dept: NEUROSURGERY | Age: 30
End: 2025-02-13
Payer: COMMERCIAL

## 2025-02-13 ENCOUNTER — OFFICE VISIT (OUTPATIENT)
Dept: INFECTIOUS DISEASES | Age: 30
End: 2025-02-13
Payer: COMMERCIAL

## 2025-02-13 VITALS
OXYGEN SATURATION: 100 % | DIASTOLIC BLOOD PRESSURE: 77 MMHG | WEIGHT: 160 LBS | RESPIRATION RATE: 18 BRPM | BODY MASS INDEX: 23.7 KG/M2 | HEART RATE: 66 BPM | SYSTOLIC BLOOD PRESSURE: 123 MMHG | HEIGHT: 69 IN

## 2025-02-13 VITALS
DIASTOLIC BLOOD PRESSURE: 77 MMHG | BODY MASS INDEX: 23.79 KG/M2 | WEIGHT: 160.6 LBS | SYSTOLIC BLOOD PRESSURE: 123 MMHG | HEIGHT: 69 IN | HEART RATE: 66 BPM

## 2025-02-13 DIAGNOSIS — R29.898 WEAKNESS OF RIGHT HAND: ICD-10-CM

## 2025-02-13 DIAGNOSIS — G56.01 CARPAL TUNNEL SYNDROME, RIGHT: ICD-10-CM

## 2025-02-13 DIAGNOSIS — L08.9 INFECTED ABRASION OF RIGHT HAND, SUBSEQUENT ENCOUNTER: ICD-10-CM

## 2025-02-13 DIAGNOSIS — M47.22 CERVICAL SPONDYLOSIS WITH RADICULOPATHY: Primary | ICD-10-CM

## 2025-02-13 DIAGNOSIS — S60.511D INFECTED ABRASION OF RIGHT HAND, SUBSEQUENT ENCOUNTER: ICD-10-CM

## 2025-02-13 DIAGNOSIS — L02.511 ABSCESS OF FINGER OF RIGHT HAND: Primary | ICD-10-CM

## 2025-02-13 DIAGNOSIS — R53.1 WEAKNESS OF RIGHT SIDE OF BODY: ICD-10-CM

## 2025-02-13 DIAGNOSIS — F90.9 ATTENTION DEFICIT HYPERACTIVITY DISORDER (ADHD), UNSPECIFIED ADHD TYPE: ICD-10-CM

## 2025-02-13 PROCEDURE — 99213 OFFICE O/P EST LOW 20 MIN: CPT | Performed by: INTERNAL MEDICINE

## 2025-02-13 PROCEDURE — 99204 OFFICE O/P NEW MOD 45 MIN: CPT

## 2025-02-13 NOTE — PROGRESS NOTES
[facility]  Office Note    Yary Chinchilla  MRN: [id number]  Date: 2/13/2025  LOS: 0     Reason for F/U :   Right hand fourth finger abscess    Impression :   Right hand ring finger abscess   status post I&D 2/6/2025  Infection resolved  Essential hypertension  Bipolar disorder  ADHD  PTSD    Recommendations:     Patient completed oral levofloxacin and doxycycline x 10-days with resolution of infection  No additional antibiotics needed  Continue local wound care.   Office follow up with hand surgery on 2-18-25      Infection Control Recommendations:   Universal precautions    Discharge Planning:   Patient will need Midline Catheter Insertion/ PICC line Insertion: No  Patient will need: Home IV , Infusion Center,  SNF,  LTAC: Undetermined  Patient willneed outpatient wound care: No    Medical Decision making / Summary of Stay:   Yary Chinchilla is a 29 y.o.-year-old male who was initially admitted on 2/6/2025. Patient seen at the request of Dr. Mcbride     INITIAL HISTORY:     This patient presented to the ED on 2/6/25 with complaints of right 4th finger pain and swelling. 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 He is a poor historian.      In the ED he was noted to have a small puncture wound to the dorsum of the right hand over the middle phalanx. There was expressible purulence. Compartments soft and easily compressible.      Labwork was unremarkable other than a CRP of 8.3 and ESR 37.     XR of the right hand showed fracture of the 5th metacarpal was noted on the previous study.  It is healed with deformity.  Ulnar positive variance.  No visualized fracture or dislocation.     IV Vancomycin was initiated and the patient underwent bedside I&D with packing per Plastic Surgery on 2/6/25. Saline soaks were ordered as the patient has a betadine allergy.     ID was consulted for patient evaluation and antibiotic recommendations.     Current evaluation:2/13/2025   Temperature Range:

## 2025-02-13 NOTE — PROGRESS NOTES
[facility]  [address]  [address]  [place] [address]  Dept: [phone]    Patient:  Yary Chinchilla  YOB: 1995  Date: 2/13/25    The patient is a 29 y.o. male who presents today for consult of the following problems:     Chief Complaint   Patient presents with    New Patient     M54.12 (ICD-10-CM) - Cervical radiculopathy    Numbness     Patient stated he loss feeling in his pinky and ring finger.         HPI:     Yary Chinchilla is a 29 y.o. male on whom neurosurgical consultation was requested by Jennifer May APRN - CNP for management of right sided pain.    The patient has been experiencing neck pain since 2022, primarily on the right side, affecting the entire right side of the neck. The pain radiates diffusely and is accompanied by jolts that travel into the entire right hand, with the 4th and 5th fingers being numb and immobile. The pain is aggravated by cold, being bumped, and sometimes occurs without a clear trigger. Despite trying gabapentin, flexeril, and physical therapy, nothing has alleviated the pain, and the patient did not continue with therapy. The severity of the pain ranges from 6-7/10 or higher, impacting dexterity and causing difficulty with , though the patient does not wake at night due to the pain. He reports trouble sleeping due to persistent pain and numbness, along with urinary urgency but no incontinence. His balance is described as \"so-so,\" depending on the day and activity level.    History of rotator cuff injury and right knee injury    History:     Past Medical History:   Diagnosis Date    ADHD (attention deficit hyperactivity disorder)     Asthma     Bipolar 1 disorder, depressed (HCC)     Borderline diabetic     Eczema     Hypertension     PTSD (post-traumatic stress disorder)      Past Surgical History:   Procedure Laterality Date    FINGER

## 2025-02-13 NOTE — TELEPHONE ENCOUNTER
Spoke with the pharmacist at [facility], he ran an OARRS report on the patients Gabapentin and it shows that he picked up 90 tablets, for a 30 day supply on 2/5/2025 for $17.22. They will not fill any additional Gabapentin at this time due to this report.     I left message for Zainab the  with this information and advised to call me back with any questions or concerns.    06-Oct-2022 18:00

## 2025-02-14 NOTE — DISCHARGE SUMMARY
[facility]  Office: [phone]  Noé You DO, Mane Hanley DO, Ignacio Ramos DO, Kali Renae DO, Yazan Coates MD, Alicja Hobson MD, Tee Larios MD, Allie Franklin MD,  Aaron Sun MD, Rick Mabry MD, Chantelle Pascal MD,  Radha Chaidez DO, Andriy Pablo MD, Chris Gonzalez MD, Tadeo You DO, Jeni Nguyen MD,  Michel Mcbride DO, Bri Lima MD, Jovanna Peck MD, Ainsley Hopkins MD, Bebe Jamison MD,  Ashutosh Denis MD, Davon Sánchez MD, Subhash Weller MD, Zenobia Mccormack MD, Adalberto Medley MD, Nj Monahan MD, Sami Guzman DO, Eber Oliver MD, Radha Hawk MD, Mohsin Reza, MD, Shirley Waterhouse, CNP,  Elle Pimentel CNP, Sami Humphries, CNP,  Araceli Wiseman, ZOE, Cherelle Hubbard, CNP, Joleen Saravia, CNP, Maureen Leary, CNP, Sia Pineda CNP, WILLIAMS Oliver-LUCIA, Caitlin Brush, CNP, Liam Anna, CNP,  Khushboo Lion, CNP, Nica Mcadams, CNP, Clarisse Williamson, CNP,  Obdulia Galeano, CNS, Caridad Martinez CNP, Domonique Jeffrey CNP,   Silva Shrestha, CNP         [facility]   IN-PATIENT SERVICE   [facility]    Discharge Summary     Patient ID: Yary Chinchilla  :  1995   MRN: [id number]     ACCOUNT:  [id number]   Patient's PCP: Jennifer May APRN - CNP  Admit Date: 2025   Discharge Date: 2025     Length of Stay: 2  Code Status:  Prior  Admitting Physician: Jeni Nguyen MD  Discharge Physician: Michel Mcbride DO     Active Discharge Diagnoses:     Hospital Problem Lists:  Principal Problem:    Abscess of finger, right  Active Problems:    Infected abrasion of right hand    Attention deficit hyperactivity disorder (ADHD)    Bipolar 1 disorder (HCC)    Abscess of finger of right hand  Resolved Problems:    * No resolved hospital problems. *    Admission Condition:  fair     Discharged Condition: good    Hospital Stay:     Hospital Course:      Is a 29-year-old male with a history of C-spine

## 2025-02-18 ENCOUNTER — OFFICE VISIT (OUTPATIENT)
Dept: BURN CARE | Age: 30
End: 2025-02-18
Payer: COMMERCIAL

## 2025-02-18 ENCOUNTER — HOSPITAL ENCOUNTER (OUTPATIENT)
Age: 30
End: 2025-02-18
Payer: COMMERCIAL

## 2025-02-18 VITALS
SYSTOLIC BLOOD PRESSURE: 127 MMHG | DIASTOLIC BLOOD PRESSURE: 80 MMHG | WEIGHT: 162.8 LBS | HEART RATE: 55 BPM | HEIGHT: 69 IN | BODY MASS INDEX: 24.11 KG/M2

## 2025-02-18 DIAGNOSIS — L02.519 ABSCESS OF FINGER, UNSPECIFIED LATERALITY: Primary | ICD-10-CM

## 2025-02-18 PROCEDURE — 99203 OFFICE O/P NEW LOW 30 MIN: CPT | Performed by: NURSE PRACTITIONER

## 2025-02-18 NOTE — PROGRESS NOTES
Burn Clinic Note    S: Pt is a 30 y.o. male being seen for continued management for infection that occurred in his fourth right finger.  He states he is almost complete with his antibiotics that he is receiving via PICC line.    O: Patient's right fourth finger has healed nicely.  There is no swelling or erythema.  He still and he cannot flex or extend at the DIP or PIP.  As a side note patient's right fifth digit does not flex or extend but this is normal from an old injury  Vitals:    02/18/25 0841   BP: 127/80   Pulse: 55     Gen: NAD, cooperative      A/P: 30 y.o. male in clinic for continued management after he got an infected fourth digit.  He is doing quite well.  There is no sign of infection.  We do feel he will need occupational therapy as he is not flexing or extending at the PIP or DIP.  He understands our plan.  Will have him come back in 8 weeks to see how he is doing.    - Participate in occupational therapy twice a week for the next 8 weeks  - Keep area clean and dry  - Wash with gentle soap  - Tylenol/ibuprofen for pain control  - F/u 8 weeks    JENN Thakkar - CNP  [place], [place]

## 2025-02-28 NOTE — ADT AUTH CERT
Diagnoses: L02.511 (ICD-10-CM) - Abscess of finger, right, S60.511A, L08.9 (ICD-10-CM) - Infected abrasion of right hand, initial encounter

## 2025-04-15 ENCOUNTER — OFFICE VISIT (OUTPATIENT)
Dept: BURN CARE | Age: 30
End: 2025-04-15
Payer: COMMERCIAL

## 2025-04-15 VITALS
SYSTOLIC BLOOD PRESSURE: 122 MMHG | OXYGEN SATURATION: 100 % | HEIGHT: 69 IN | DIASTOLIC BLOOD PRESSURE: 68 MMHG | HEART RATE: 59 BPM | WEIGHT: 171 LBS | BODY MASS INDEX: 25.33 KG/M2

## 2025-04-15 DIAGNOSIS — L02.519 ABSCESS OF FINGER, UNSPECIFIED LATERALITY: Primary | ICD-10-CM

## 2025-04-15 PROCEDURE — 99213 OFFICE O/P EST LOW 20 MIN: CPT | Performed by: NURSE PRACTITIONER

## 2025-04-15 PROCEDURE — G8427 DOCREV CUR MEDS BY ELIG CLIN: HCPCS | Performed by: NURSE PRACTITIONER

## 2025-04-15 PROCEDURE — G8419 CALC BMI OUT NRM PARAM NOF/U: HCPCS | Performed by: NURSE PRACTITIONER

## 2025-04-15 PROCEDURE — 1036F TOBACCO NON-USER: CPT | Performed by: NURSE PRACTITIONER

## 2025-04-15 NOTE — PROGRESS NOTES
Hand Clinic Note    S: Pt is a 30 y.o. male being seen for continued evaluation of right fourth finger following an infection that required I & D and a PICC line. The infection resolved and pt had very limited ROM in the digit. He was referred for OT but states he was in snf and unable to attend the sessions. He continues to have very limited ROM in the digit.     O:   Vitals:    04/15/25 0828   BP: 122/68   Pulse: 59   SpO2: 100%     Gen: NAD, cooperative    Right fourth finger: Pt unable actively to flex and extend finger. Joints ar supple. No triggering, no locking.with passive flexion and extension       A/P: 30 y.o. male being seen for continued evaluation of right fourth finger     - Will reorder OT   - Keep area clean and dry  - Wash with gentle soap  - F/u 8 weeks    JENN Briseno - CNP   [place], [place]

## 2025-04-18 ENCOUNTER — HOSPITAL ENCOUNTER (OUTPATIENT)
Age: 30
Setting detail: THERAPIES SERIES
Discharge: HOME | End: 2025-04-18
Payer: COMMERCIAL

## 2025-04-18 PROCEDURE — 97110 THERAPEUTIC EXERCISES: CPT

## 2025-04-18 PROCEDURE — 97166 OT EVAL MOD COMPLEX 45 MIN: CPT
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
## 2025-04-18 NOTE — PRE-CERTIFICATION NOTE
[x] [facility]  Outpatient Rehabilitation &  Therapy  [address]  [phone]  [phone] [] [facility]  Outpatient Rehabilitation &  Therapy  [address]  P: [phone]  F: [phone] [] [facility]  Outpatient Rehabilitation &  Therapy  [address]  P: [phone]  F: [phone] [] [facility]  Outpatient Rehabilitation &  Therapy  [address]  [phone]  [phone] [] [facility]  Outpatient Rehabilitation &  Therapy  [address]   P: [phone]  F: [phone]  [] [facility]  Outpatient Rehabilitation &  Therapy  [address]  P: [phone]  F: [phone] [] [facility]  Outpatient Rehabilitation &  Therapy  [address]  P: [phone]  F: [phone] [] [facility]  Outpatient Rehabilitation &  Therapy  [address]  P: [phone]  F: [phone] [] [facility]  Outpatient Rehabilitation &  Therapy  [address]  P: [phone]  F: [phone]  [] [facility] Outpatient Rehabilitation &  Therapy  [address]  P: [phone]  F: [phone]          Therapy Pre-certification Note      4/18/2025    Yary Chinchilla  1995   [id number]      Insurance approval was received for Occupational Therapy from [facility] on 4/18/2025.  Approval was received for 8 visits, from 4/18/2028 to 6/17/2025.  Authorization number [id number] (Therapy-OT) .    Patient is scheduled      Electronically signed by Gabriela Aldrich PT on 4/18/2025 at 12:33 PM

## 2025-05-06 ENCOUNTER — OFFICE VISIT (OUTPATIENT)
Dept: NEUROLOGY | Age: 30
End: 2025-05-06
Payer: COMMERCIAL

## 2025-05-06 ENCOUNTER — HOSPITAL ENCOUNTER (OUTPATIENT)
Age: 30
Setting detail: THERAPIES SERIES
Discharge: HOME | End: 2025-05-06

## 2025-05-06 VITALS
HEART RATE: 62 BPM | BODY MASS INDEX: 24.44 KG/M2 | WEIGHT: 165 LBS | SYSTOLIC BLOOD PRESSURE: 109 MMHG | DIASTOLIC BLOOD PRESSURE: 78 MMHG | HEIGHT: 69 IN

## 2025-05-06 DIAGNOSIS — M25.511 CHRONIC RIGHT SHOULDER PAIN: Primary | ICD-10-CM

## 2025-05-06 DIAGNOSIS — R29.898 RIGHT HAND WEAKNESS: ICD-10-CM

## 2025-05-06 DIAGNOSIS — G89.29 CHRONIC RIGHT SHOULDER PAIN: Primary | ICD-10-CM

## 2025-05-06 DIAGNOSIS — M54.12 CERVICAL RADICULOPATHY: ICD-10-CM

## 2025-05-06 PROCEDURE — 99214 OFFICE O/P EST MOD 30 MIN: CPT | Performed by: PSYCHIATRY & NEUROLOGY

## 2025-05-06 PROCEDURE — G8420 CALC BMI NORM PARAMETERS: HCPCS | Performed by: PSYCHIATRY & NEUROLOGY

## 2025-05-06 PROCEDURE — G8427 DOCREV CUR MEDS BY ELIG CLIN: HCPCS | Performed by: PSYCHIATRY & NEUROLOGY

## 2025-05-06 PROCEDURE — 1036F TOBACCO NON-USER: CPT | Performed by: PSYCHIATRY & NEUROLOGY
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
## 2025-05-06 NOTE — FLOWSHEET NOTE
[x] [facility]       Occupational Therapy       [address]       Phone: [phone]       Fax: [phone] [] [facility] Occupational  Therapy at [place]       [address]       Phone: [phone]       Fax: [phone] [] [facility]  Outpatient Rehabilitation &  Therapy  [address]   [address]  P: [phone]  F: [phone]           Occupational Therapy Cancel/No Show note    Date: 2025  Patient: Yary Chinchilla  : 1995  MRN: [id number]  Cancels/No Shows to date:     For today's appointment patient:  [x]  Cancelled  []  Rescheduled appointment  []  No-show     Reason given by patient:  []  Patient ill  []  Conflicting appointment  []  No transportation    []  Conflict with work  []  No reason given  [x]  Other: in the ED   Comments: patient called and LVM at 3:27am to CX OT, he is in the ED, no future appointments scheduled, will discharge if no contact is made     Electronically signed by: CHAVEZ Garcia/LUCIA

## 2025-05-08 ENCOUNTER — TELEPHONE (OUTPATIENT)
Dept: NEUROLOGY | Age: 30
End: 2025-05-08

## 2025-05-08 NOTE — TELEPHONE ENCOUNTER
Dr. Hamilton is retired.  I spoke to the patient, he has an appointment with Dr Ny on 06 16 2025 and I told him that he needs to keep that appointment.  KS

## 2025-05-16 ENCOUNTER — HOSPITAL ENCOUNTER (EMERGENCY)
Age: 30
Discharge: HOME | End: 2025-05-16
Attending: EMERGENCY MEDICINE
Payer: COMMERCIAL

## 2025-05-16 ENCOUNTER — APPOINTMENT (OUTPATIENT)
Dept: GENERAL RADIOLOGY | Age: 30
End: 2025-05-16
Payer: COMMERCIAL

## 2025-05-16 VITALS
HEART RATE: 63 BPM | OXYGEN SATURATION: 98 % | RESPIRATION RATE: 18 BRPM | DIASTOLIC BLOOD PRESSURE: 79 MMHG | SYSTOLIC BLOOD PRESSURE: 126 MMHG | TEMPERATURE: 97.9 F

## 2025-05-16 DIAGNOSIS — M79.644 PAIN IN FINGER OF RIGHT HAND: Primary | ICD-10-CM

## 2025-05-16 PROBLEM — M79.645 PAIN IN FINGER OF LEFT HAND: Status: ACTIVE | Noted: 2025-05-16

## 2025-05-16 PROCEDURE — 99283 EMERGENCY DEPT VISIT LOW MDM: CPT | Performed by: EMERGENCY MEDICINE

## 2025-05-16 PROCEDURE — 73130 X-RAY EXAM OF HAND: CPT

## 2025-05-16 RX ORDER — CEPHALEXIN 500 MG/1
500 CAPSULE ORAL 2 TIMES DAILY
Qty: 14 CAPSULE | Refills: 0 | Status: SHIPPED | OUTPATIENT
Start: 2025-05-16 | End: 2025-05-22 | Stop reason: ALTCHOICE

## 2025-05-16 RX ORDER — ACETAMINOPHEN 500 MG
1000 TABLET ORAL 3 TIMES DAILY PRN
Qty: 30 TABLET | Refills: 0 | Status: SHIPPED | OUTPATIENT
Start: 2025-05-16 | End: 2025-05-22 | Stop reason: ALTCHOICE

## 2025-05-16 ASSESSMENT — ENCOUNTER SYMPTOMS
RESPIRATORY NEGATIVE: 1
EYES NEGATIVE: 1
GASTROINTESTINAL NEGATIVE: 1

## 2025-05-16 NOTE — ED PROVIDER NOTES
[facility] EMERGENCY DEPARTMENT     Emergency Department     Faculty Attestation        I performed a history and physical examination of the patient and discussed management with the resident. I reviewed the resident’s note and agree with the documented findings and plan of care. Any areas of disagreement are noted on the chart. I was personally present for the key portions of any procedures. I have documented in the chart those procedures where I was not present during the key portions. I have reviewed the emergency nurses triage note. I agree with the chief complaint, past medical history, past surgical history, allergies, medications, social and family history as documented unless otherwise noted below.  For Physician Assistant/ Nurse Practitioner cases/documentation I have personally evaluated this patient and have completed at least one if not all key elements of the E/M (history, physical exam, and MDM). Additional findings are as noted.      Vital Signs: BP: 126/79  Pulse: 63  Respirations: 18  Temp: 97.9 °F (36.6 °C) SpO2: 98 %  PCP:  Jennifer May APRN - CNP  Note Started: 5/16/25, 9:25 AM EDT    Pertinent Comments:     Patient is a 30-year-old male with some subtle swelling of his right fifth digit/pinky finger.   Of note patient feels a little on this secondary to 2 previous injuries with ulnar nerve involvement.   There is no erythema or warmth or obvious signs of overt infection but does have some slight swelling compared to the other side of that fifth digit.   Capillary refill brisk and less than 2 seconds.    Assessment/Plan: Patient does use his hands at work significant amount and could have incurred a occult injury.   Will obtain x-ray but if negative and giving patient concern possibly brief course of Keflex.        Critical Care  None      (Please note that portions of this note were completed with a voice recognition program. Efforts were  made to edit the dictations but occasionally words are mis-transcribed. Whenever words are used in this note in any gender, they shall be construed as though they were used in the gender appropriate to the circumstances; and whenever words are used in this note in the singular or plural form, they shall be construed as though they were used in the form appropriate to the circumstances.)    Joaquin Zapata MD [facility]  Attending Emergency Medicine Physician           Joaquin Zapata MD  05/16/25 0925       Joaquin Zapata MD  05/16/25 0937

## 2025-05-16 NOTE — ED PROVIDER NOTES
[facility] EMERGENCY DEPARTMENT  Emergency Department Encounter  Emergency Medicine Resident     Pt Name:Yary Chinchilla  MRN: [id number]  Birthdate 1995  Date of evaluation: 5/16/25  PCP:  Jennifer May APRN - CNP  Note Started: 10:47 AM EDT    CHIEF COMPLAINT       Chief Complaint   Patient presents with    Finger Pain     Right hand pinky finger. Pt denies any injury      HISTORY OF PRESENT ILLNESS  (Location/Symptom, Timing/Onset, Context/Setting, Quality, Duration, Modifying Factors, Severity.)      Yary Chinchilla is a 30 y.o. male who presents with right fifth finger pain by 2 months.  Patient has not known chronic ulnar injury to the area denies any recent trauma/fall.  Patient stated had a previous history of right hand cellulitis and abscess drainage to the fourth finger.  Patient denies any fever any nausea vomiting pain out of proportion with any other symptoms.  Patient states followed up in orthopedic clinic for a right shoulder injury with that ulnar neuropathy.  Patient was initially verbally abusive during the initial encounter as he was talking to his \"baby's mom on phone \"and then also started to accuse  Of denying his symptoms.  Patient then calmed down and stayed for the duration of treatment.    PAST MEDICAL / SURGICAL / SOCIAL / FAMILY HISTORY      has a past medical history of ADHD (attention deficit hyperactivity disorder), Asthma, Bipolar 1 disorder, depressed (HCC), Borderline diabetic, Eczema, Hypertension, and PTSD (post-traumatic stress disorder).     has a past surgical history that includes Finger surgery.    Social History     Socioeconomic History    Marital status: Single     Spouse name: Not on file    Number of children: Not on file    Years of education: Not on file    Highest education level: Not on file   Occupational History    Not on file   Tobacco Use    Smoking status: Never     Passive exposure: Never    Smokeless tobacco: Never   Vaping Use    Vaping

## 2025-05-16 NOTE — DISCHARGE INSTRUCTIONS
You were seen in the emergency department for right fifth finger pain. Your x-rays did not show any acute process, however concern for early redness you were covered with a week supply of Keflex.  Please return if you have any further concerns    Please use the medications prescribed to you as instructed.    Please follow-up with your PCP in 1 day.  If you do not have a PCP please establish care with the [facility] [place].  Information was given to make an appointment.    You are advised that sometimes early in the process of injury or disease it may be difficult to diagnose.  If you have any new or worsening symptoms in the next few days please return to the ED for further evaluation.    Please remember to fill out your visit survey sent to you.  Please tell us if we did anything good or anything particularly bad that you would like us to know about so that we could improve our care to you.    Thank you for allowing us to care for you today.     Dr. LIT Singletary
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
## 2025-05-20 ENCOUNTER — OFFICE VISIT (OUTPATIENT)
Dept: PRIMARY CARE CLINIC | Age: 30
End: 2025-05-20
Payer: COMMERCIAL

## 2025-05-20 ENCOUNTER — HOSPITAL ENCOUNTER (OUTPATIENT)
Age: 30
Discharge: HOME | End: 2025-05-20
Payer: COMMERCIAL

## 2025-05-20 VITALS
TEMPERATURE: 97 F | WEIGHT: 164 LBS | HEART RATE: 49 BPM | HEIGHT: 69 IN | SYSTOLIC BLOOD PRESSURE: 100 MMHG | OXYGEN SATURATION: 100 % | DIASTOLIC BLOOD PRESSURE: 68 MMHG | BODY MASS INDEX: 24.29 KG/M2

## 2025-05-20 DIAGNOSIS — Z11.3 SCREENING EXAMINATION FOR STD (SEXUALLY TRANSMITTED DISEASE): ICD-10-CM

## 2025-05-20 DIAGNOSIS — R71.8 MICROCYTOSIS: ICD-10-CM

## 2025-05-20 DIAGNOSIS — F31.9 BIPOLAR 1 DISORDER (HCC): ICD-10-CM

## 2025-05-20 DIAGNOSIS — J45.20 MILD INTERMITTENT ASTHMA WITHOUT COMPLICATION: Primary | ICD-10-CM

## 2025-05-20 LAB
ALBUMIN SERPL-MCNC: 4 G/DL (ref 3.5–5.2)
ALBUMIN/GLOB SERPL: 1.5 {RATIO} (ref 1–2.5)
ALP SERPL-CCNC: 83 U/L (ref 40–129)
ALT SERPL-CCNC: 14 U/L (ref 10–50)
ANION GAP SERPL CALCULATED.3IONS-SCNC: 8 MMOL/L (ref 9–16)
AST SERPL-CCNC: 20 U/L (ref 10–50)
BASOPHILS # BLD: 0.05 K/UL (ref 0–0.2)
BASOPHILS NFR BLD: 1 % (ref 0–2)
BILIRUB SERPL-MCNC: 0.3 MG/DL (ref 0–1.2)
BUN SERPL-MCNC: 8 MG/DL (ref 6–20)
CALCIUM SERPL-MCNC: 9.3 MG/DL (ref 8.6–10.4)
CHLORIDE SERPL-SCNC: 104 MMOL/L (ref 98–107)
CO2 SERPL-SCNC: 27 MMOL/L (ref 20–31)
CREAT SERPL-MCNC: 1.1 MG/DL (ref 0.7–1.2)
EOSINOPHIL # BLD: 0.19 K/UL (ref 0–0.44)
EOSINOPHILS RELATIVE PERCENT: 3 % (ref 1–4)
ERYTHROCYTE [DISTWIDTH] IN BLOOD BY AUTOMATED COUNT: 13.2 % (ref 11.8–14.4)
FERRITIN SERPL-MCNC: 80 NG/ML (ref 30–400)
GFR, ESTIMATED: >90 ML/MIN/1.73M2
GLUCOSE SERPL-MCNC: 90 MG/DL (ref 74–99)
HBV SURFACE AB SERPL IA-ACNC: <3.5 MIU/ML
HBV SURFACE AG SERPL QL IA: NONREACTIVE
HCT VFR BLD AUTO: 43.5 % (ref 40.7–50.3)
HCV AB SERPL QL IA: NONREACTIVE
HGB BLD-MCNC: 13.7 G/DL (ref 13–17)
HIV 1+2 AB+HIV1 P24 AG SERPL QL IA: NONREACTIVE
IMM GRANULOCYTES # BLD AUTO: <0.03 K/UL (ref 0–0.3)
IMM GRANULOCYTES NFR BLD: 0 %
IRON SATN MFR SERPL: 17 % (ref 20–55)
IRON SERPL-MCNC: 46 UG/DL (ref 61–157)
LYMPHOCYTES NFR BLD: 2.76 K/UL (ref 1.1–3.7)
LYMPHOCYTES RELATIVE PERCENT: 41 % (ref 24–43)
MCH RBC QN AUTO: 22.2 PG (ref 25.2–33.5)
MCHC RBC AUTO-ENTMCNC: 31.5 G/DL (ref 28.4–34.8)
MCV RBC AUTO: 70.4 FL (ref 82.6–102.9)
MONOCYTES NFR BLD: 0.42 K/UL (ref 0.1–1.2)
MONOCYTES NFR BLD: 6 % (ref 3–12)
NEUTROPHILS NFR BLD: 49 % (ref 36–65)
NEUTS SEG NFR BLD: 3.35 K/UL (ref 1.5–8.1)
NRBC BLD-RTO: 0 PER 100 WBC
PLATELET # BLD AUTO: 330 K/UL (ref 138–453)
PMV BLD AUTO: 10.8 FL (ref 8.1–13.5)
POTASSIUM SERPL-SCNC: 3.9 MMOL/L (ref 3.7–5.3)
PROT SERPL-MCNC: 6.7 G/DL (ref 6.6–8.7)
RBC # BLD AUTO: 6.18 M/UL (ref 4.21–5.77)
RBC # BLD: ABNORMAL 10*6/UL
SODIUM SERPL-SCNC: 139 MMOL/L (ref 136–145)
T PALLIDUM AB SER QL IA: NONREACTIVE
TIBC SERPL-MCNC: 266 UG/DL (ref 250–450)
UNSATURATED IRON BINDING CAPACITY: 220 UG/DL (ref 112–347)
WBC OTHER # BLD: 6.8 K/UL (ref 3.5–11.3)

## 2025-05-20 PROCEDURE — 99214 OFFICE O/P EST MOD 30 MIN: CPT | Performed by: NURSE PRACTITIONER

## 2025-05-20 PROCEDURE — 85025 COMPLETE CBC W/AUTO DIFF WBC: CPT

## 2025-05-20 PROCEDURE — 86780 TREPONEMA PALLIDUM: CPT

## 2025-05-20 PROCEDURE — G8427 DOCREV CUR MEDS BY ELIG CLIN: HCPCS | Performed by: NURSE PRACTITIONER

## 2025-05-20 PROCEDURE — 87491 CHLMYD TRACH DNA AMP PROBE: CPT

## 2025-05-20 PROCEDURE — 87389 HIV-1 AG W/HIV-1&-2 AB AG IA: CPT

## 2025-05-20 PROCEDURE — 1036F TOBACCO NON-USER: CPT | Performed by: NURSE PRACTITIONER

## 2025-05-20 PROCEDURE — 80053 COMPREHEN METABOLIC PANEL: CPT

## 2025-05-20 PROCEDURE — 87340 HEPATITIS B SURFACE AG IA: CPT

## 2025-05-20 PROCEDURE — 83540 ASSAY OF IRON: CPT

## 2025-05-20 PROCEDURE — 87591 N.GONORRHOEAE DNA AMP PROB: CPT

## 2025-05-20 PROCEDURE — 82728 ASSAY OF FERRITIN: CPT

## 2025-05-20 PROCEDURE — 86317 IMMUNOASSAY INFECTIOUS AGENT: CPT

## 2025-05-20 PROCEDURE — 83550 IRON BINDING TEST: CPT

## 2025-05-20 PROCEDURE — G8420 CALC BMI NORM PARAMETERS: HCPCS | Performed by: NURSE PRACTITIONER

## 2025-05-20 PROCEDURE — 86803 HEPATITIS C AB TEST: CPT

## 2025-05-20 RX ORDER — CYCLOBENZAPRINE HCL 10 MG
10 TABLET ORAL 3 TIMES DAILY PRN
Qty: 30 TABLET | Refills: 1 | Status: SHIPPED | OUTPATIENT
Start: 2025-05-20

## 2025-05-20 SDOH — ECONOMIC STABILITY: FOOD INSECURITY: WITHIN THE PAST 12 MONTHS, YOU WORRIED THAT YOUR FOOD WOULD RUN OUT BEFORE YOU GOT MONEY TO BUY MORE.: NEVER TRUE

## 2025-05-20 SDOH — ECONOMIC STABILITY: FOOD INSECURITY: WITHIN THE PAST 12 MONTHS, THE FOOD YOU BOUGHT JUST DIDN'T LAST AND YOU DIDN'T HAVE MONEY TO GET MORE.: NEVER TRUE

## 2025-05-20 ASSESSMENT — PATIENT HEALTH QUESTIONNAIRE - PHQ9
SUM OF ALL RESPONSES TO PHQ QUESTIONS 1-9: 17
1. LITTLE INTEREST OR PLEASURE IN DOING THINGS: NEARLY EVERY DAY
9. THOUGHTS THAT YOU WOULD BE BETTER OFF DEAD, OR OF HURTING YOURSELF: NOT AT ALL
SUM OF ALL RESPONSES TO PHQ QUESTIONS 1-9: 17
7. TROUBLE CONCENTRATING ON THINGS, SUCH AS READING THE NEWSPAPER OR WATCHING TELEVISION: NEARLY EVERY DAY
SUM OF ALL RESPONSES TO PHQ QUESTIONS 1-9: 17
2. FEELING DOWN, DEPRESSED OR HOPELESS: SEVERAL DAYS
5. POOR APPETITE OR OVEREATING: NEARLY EVERY DAY
3. TROUBLE FALLING OR STAYING ASLEEP: NEARLY EVERY DAY
10. IF YOU CHECKED OFF ANY PROBLEMS, HOW DIFFICULT HAVE THESE PROBLEMS MADE IT FOR YOU TO DO YOUR WORK, TAKE CARE OF THINGS AT HOME, OR GET ALONG WITH OTHER PEOPLE: SOMEWHAT DIFFICULT
6. FEELING BAD ABOUT YOURSELF - OR THAT YOU ARE A FAILURE OR HAVE LET YOURSELF OR YOUR FAMILY DOWN: SEVERAL DAYS
8. MOVING OR SPEAKING SO SLOWLY THAT OTHER PEOPLE COULD HAVE NOTICED. OR THE OPPOSITE, BEING SO FIGETY OR RESTLESS THAT YOU HAVE BEEN MOVING AROUND A LOT MORE THAN USUAL: NOT AT ALL
DEPRESSION UNABLE TO ASSESS: PT REFUSES
4. FEELING TIRED OR HAVING LITTLE ENERGY: NEARLY EVERY DAY
SUM OF ALL RESPONSES TO PHQ QUESTIONS 1-9: 17
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
## 2025-05-22 ENCOUNTER — RESULTS FOLLOW-UP (OUTPATIENT)
Dept: PRIMARY CARE CLINIC | Age: 30
End: 2025-05-22

## 2025-05-22 DIAGNOSIS — E61.1 IRON DEFICIENCY: Primary | ICD-10-CM

## 2025-05-22 DIAGNOSIS — J45.20 MILD INTERMITTENT ASTHMA WITHOUT COMPLICATION: ICD-10-CM

## 2025-05-22 RX ORDER — FERROUS SULFATE 325(65) MG
325 TABLET ORAL
Qty: 90 TABLET | Refills: 1 | Status: SHIPPED | OUTPATIENT
Start: 2025-05-22

## 2025-05-22 RX ORDER — ALBUTEROL SULFATE 90 UG/1
2 INHALANT RESPIRATORY (INHALATION) 4 TIMES DAILY PRN
Qty: 54 G | Refills: 1 | Status: SHIPPED | OUTPATIENT
Start: 2025-05-22

## 2025-05-22 ASSESSMENT — ENCOUNTER SYMPTOMS
COLOR CHANGE: 0
CHEST TIGHTNESS: 0
SINUS PRESSURE: 0
SORE THROAT: 0
ABDOMINAL PAIN: 0
DIARRHEA: 0
VOMITING: 0
NAUSEA: 0
PHOTOPHOBIA: 0
COUGH: 0
SHORTNESS OF BREATH: 0
SINUS PAIN: 0
BACK PAIN: 0

## 2025-06-19 ENCOUNTER — HOSPITAL ENCOUNTER (OUTPATIENT)
Dept: MRI IMAGING | Age: 30
Discharge: HOME | End: 2025-06-21
Attending: PSYCHIATRY & NEUROLOGY
Payer: COMMERCIAL

## 2025-06-19 DIAGNOSIS — M54.12 CERVICAL RADICULOPATHY: ICD-10-CM

## 2025-06-19 PROCEDURE — 72141 MRI NECK SPINE W/O DYE: CPT

## 2025-06-27 ENCOUNTER — RESULTS FOLLOW-UP (OUTPATIENT)
Dept: NEUROLOGY | Age: 30
End: 2025-06-27

## 2025-07-10 ENCOUNTER — HOSPITAL ENCOUNTER (EMERGENCY)
Age: 30
Discharge: HOME | End: 2025-07-10
Attending: EMERGENCY MEDICINE
Payer: COMMERCIAL

## 2025-07-10 VITALS
SYSTOLIC BLOOD PRESSURE: 127 MMHG | TEMPERATURE: 97.5 F | RESPIRATION RATE: 18 BRPM | HEART RATE: 58 BPM | OXYGEN SATURATION: 100 % | DIASTOLIC BLOOD PRESSURE: 80 MMHG | BODY MASS INDEX: 24.88 KG/M2 | HEIGHT: 69 IN | WEIGHT: 168 LBS

## 2025-07-10 DIAGNOSIS — G89.29 CHRONIC RIGHT SHOULDER PAIN: Primary | ICD-10-CM

## 2025-07-10 DIAGNOSIS — M25.511 CHRONIC RIGHT SHOULDER PAIN: Primary | ICD-10-CM

## 2025-07-10 PROCEDURE — 99283 EMERGENCY DEPT VISIT LOW MDM: CPT

## 2025-07-10 RX ORDER — METHOCARBAMOL 1000 MG/1
1000 TABLET, FILM COATED ORAL 4 TIMES DAILY PRN
Qty: 120 TABLET | Refills: 0 | Status: SHIPPED | OUTPATIENT
Start: 2025-07-10

## 2025-07-10 RX ORDER — METHOCARBAMOL 500 MG/1
1000 TABLET, FILM COATED ORAL ONCE
Status: DISCONTINUED | OUTPATIENT
Start: 2025-07-10 | End: 2025-07-10 | Stop reason: HOSPADM

## 2025-07-10 RX ORDER — ACETAMINOPHEN 500 MG
1000 TABLET ORAL ONCE
Status: DISCONTINUED | OUTPATIENT
Start: 2025-07-10 | End: 2025-07-10 | Stop reason: HOSPADM

## 2025-07-10 RX ORDER — GABAPENTIN 300 MG/1
300 CAPSULE ORAL 2 TIMES DAILY
Qty: 180 CAPSULE | Refills: 0 | Status: SHIPPED | OUTPATIENT
Start: 2025-07-10 | End: 2026-01-06

## 2025-07-10 RX ORDER — ACETAMINOPHEN 500 MG
1000 TABLET ORAL 3 TIMES DAILY
Qty: 540 TABLET | Refills: 0 | Status: SHIPPED | OUTPATIENT
Start: 2025-07-10

## 2025-07-10 RX ORDER — GABAPENTIN 300 MG/1
300 CAPSULE ORAL ONCE
Status: DISCONTINUED | OUTPATIENT
Start: 2025-07-10 | End: 2025-07-10 | Stop reason: HOSPADM

## 2025-07-10 ASSESSMENT — LIFESTYLE VARIABLES
HOW OFTEN DO YOU HAVE A DRINK CONTAINING ALCOHOL: PATIENT DECLINED
HOW MANY STANDARD DRINKS CONTAINING ALCOHOL DO YOU HAVE ON A TYPICAL DAY: PATIENT DECLINED
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
## 2025-07-10 NOTE — DISCHARGE INSTRUCTIONS
You were evaluated in the emergency department for right shoulder pain.  Please contact your primary care provider to schedule an MRI of your right shoulder.  After MRI of shoulder recommend follow-up with orthopedic surgery, contact information is provided below.    For pain you may take Tylenol, gabapentin and Robaxin as needed.  These prescriptions have been sent to your preferred pharmacy on file.    Return to the emergency department immediately if you have worsening of your symptoms.

## 2025-07-10 NOTE — ED PROVIDER NOTES
[facility]   Emergency Department  Faculty Attestation       I performed a history and physical examination of the patient and discussed management with the resident. I reviewed the resident’s note and agree with the documented findings including all diagnostic interpretations and plan of care. Any areas of disagreement are noted on the chart. I was personally present for the key portions of any procedures. I have documented in the chart those procedures where I was not present during the key portions. I have reviewed the emergency nurses triage note. I agree with the chief complaint, past medical history, past surgical history, allergies, medications, social and family history as documented unless otherwise noted below.  For Physician Assistant/ Nurse Practitioner cases/documentation I have personally evaluated this patient and have completed at least one if not all key elements of the E/M (history, physical exam, and MDM). Additional findings are as noted.    Patient Name: Yary Chinchilla  MRN: [id number]  : 1995  Primary Care Physician: Jennifer May APRN - CNP    Date of evaluationa: 7/10/2025   Note Started: 6:28 AM EDT    Pertinent Comments     Chief Complaint:   Chief Complaint   Patient presents with    Shoulder Pain     X 3 years, chronic    Hand Pain     X 3 years, chronic        Initial vitals: (If not listed, please see nursing documentation)  ED Triage Vitals [07/10/25 0554]   BP Systolic BP Percentile Diastolic BP Percentile Temp Temp Source Pulse Respirations SpO2   127/80 -- -- 97.5 °F (36.4 °C) Oral 58 18 100 %      Height Weight - Scale         1.753 m (5' 9\") 76.2 kg (168 lb)              HPI/PE/Impression:  This is a 30 y.o. male who presents to the Emergency Department with right shoulder pain with going on for several years.  States that is began acutely worsened last week.  Takes Flexeril without relief.  On exam, has tenderness over the anterior portion of the shoulder.

## 2025-07-10 NOTE — ED TRIAGE NOTES
Pt presents to ED from home c/o chronic right sided shoulder and hand pain ongoing for 3 years, but worse over the past week. Pt has had extensive workups regarding this issue, including MRI. Pt denies any new injury/trauma to extremities.     Pt is A&OX4, resting on stretcher, NAD. Pt has no obvious DCAP-BTLS to extremity. Vitals WNL.

## 2025-07-29 ENCOUNTER — APPOINTMENT (OUTPATIENT)
Dept: GENERAL RADIOLOGY | Age: 30
End: 2025-07-29
Payer: COMMERCIAL

## 2025-07-29 ENCOUNTER — HOSPITAL ENCOUNTER (EMERGENCY)
Age: 30
Discharge: HOME | End: 2025-07-29
Attending: EMERGENCY MEDICINE
Payer: COMMERCIAL

## 2025-07-29 VITALS
HEIGHT: 69 IN | DIASTOLIC BLOOD PRESSURE: 74 MMHG | WEIGHT: 170 LBS | RESPIRATION RATE: 16 BRPM | TEMPERATURE: 98.1 F | HEART RATE: 79 BPM | SYSTOLIC BLOOD PRESSURE: 118 MMHG | BODY MASS INDEX: 25.18 KG/M2 | OXYGEN SATURATION: 98 %

## 2025-07-29 DIAGNOSIS — M65.20 CALCIFIC TENDINITIS: ICD-10-CM

## 2025-07-29 DIAGNOSIS — S46.911A STRAIN OF RIGHT SHOULDER, INITIAL ENCOUNTER: Primary | ICD-10-CM

## 2025-07-29 PROCEDURE — 99283 EMERGENCY DEPT VISIT LOW MDM: CPT

## 2025-07-29 PROCEDURE — 73030 X-RAY EXAM OF SHOULDER: CPT

## 2025-07-29 PROCEDURE — 6370000000 HC RX 637 (ALT 250 FOR IP): Performed by: PHYSICIAN ASSISTANT

## 2025-07-29 RX ORDER — ACETAMINOPHEN 500 MG
1000 TABLET ORAL ONCE
Status: COMPLETED | OUTPATIENT
Start: 2025-07-29 | End: 2025-07-29

## 2025-07-29 RX ADMIN — ACETAMINOPHEN 1000 MG: 500 TABLET ORAL at 16:00

## 2025-07-29 ASSESSMENT — PAIN DESCRIPTION - PAIN TYPE
TYPE: ACUTE PAIN
TYPE: ACUTE PAIN

## 2025-07-29 ASSESSMENT — PAIN SCALES - GENERAL
PAINLEVEL_OUTOF10: 8
PAINLEVEL_OUTOF10: 7
PAINLEVEL_OUTOF10: 5

## 2025-07-29 ASSESSMENT — PAIN DESCRIPTION - LOCATION
LOCATION: SHOULDER

## 2025-07-29 ASSESSMENT — PAIN DESCRIPTION - DESCRIPTORS
DESCRIPTORS: THROBBING;SHARP
DESCRIPTORS: SHARP;ACHING;TINGLING

## 2025-07-29 ASSESSMENT — PAIN DESCRIPTION - ORIENTATION
ORIENTATION: RIGHT
ORIENTATION: RIGHT

## 2025-07-29 ASSESSMENT — PAIN - FUNCTIONAL ASSESSMENT
PAIN_FUNCTIONAL_ASSESSMENT: 0-10
PAIN_FUNCTIONAL_ASSESSMENT: 0-10

## 2025-07-29 ASSESSMENT — PAIN DESCRIPTION - FREQUENCY: FREQUENCY: CONTINUOUS
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
## 2025-07-30 NOTE — TELEPHONE ENCOUNTER
Patient calling for refill of Cymbalta 60mg.      Medication active on med list yes      Date of last fill: New medication provider's recommendation verified on 7/30/2025   verified by VS LPN      Date of last appointment 5/6/25    Next Visit Date:  Visit date not found
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
## 2025-07-31 RX ORDER — DULOXETIN HYDROCHLORIDE 60 MG/1
60 CAPSULE, DELAYED RELEASE ORAL DAILY
Qty: 90 CAPSULE | Refills: 1 | Status: SHIPPED | OUTPATIENT
Start: 2025-07-31